# Patient Record
Sex: MALE | Race: BLACK OR AFRICAN AMERICAN | NOT HISPANIC OR LATINO
[De-identification: names, ages, dates, MRNs, and addresses within clinical notes are randomized per-mention and may not be internally consistent; named-entity substitution may affect disease eponyms.]

---

## 2017-01-06 ENCOUNTER — MEDICATION RENEWAL (OUTPATIENT)
Age: 22
End: 2017-01-06

## 2017-01-23 ENCOUNTER — APPOINTMENT (OUTPATIENT)
Dept: PEDIATRIC NEUROLOGY | Facility: CLINIC | Age: 22
End: 2017-01-23

## 2017-02-13 ENCOUNTER — MEDICATION RENEWAL (OUTPATIENT)
Age: 22
End: 2017-02-13

## 2017-03-20 ENCOUNTER — MEDICATION RENEWAL (OUTPATIENT)
Age: 22
End: 2017-03-20

## 2017-03-27 ENCOUNTER — MESSAGE (OUTPATIENT)
Age: 22
End: 2017-03-27

## 2017-03-27 ENCOUNTER — APPOINTMENT (OUTPATIENT)
Dept: PEDIATRIC NEUROLOGY | Facility: CLINIC | Age: 22
End: 2017-03-27

## 2017-05-08 ENCOUNTER — APPOINTMENT (OUTPATIENT)
Dept: PEDIATRIC NEUROLOGY | Facility: CLINIC | Age: 22
End: 2017-05-08

## 2017-05-08 ENCOUNTER — LABORATORY RESULT (OUTPATIENT)
Age: 22
End: 2017-05-08

## 2017-05-08 VITALS
DIASTOLIC BLOOD PRESSURE: 88 MMHG | BODY MASS INDEX: 33.99 KG/M2 | HEIGHT: 65.75 IN | WEIGHT: 209 LBS | SYSTOLIC BLOOD PRESSURE: 140 MMHG

## 2017-05-08 DIAGNOSIS — F98.4 STEREOTYPED MOVEMENT DISORDERS: ICD-10-CM

## 2017-05-14 LAB
ALBUMIN SERPL ELPH-MCNC: 4.8 G/DL
ALP BLD-CCNC: 78 U/L
ALT SERPL-CCNC: 21 U/L
ANION GAP SERPL CALC-SCNC: 18 MMOL/L
AST SERPL-CCNC: 16 U/L
BASOPHILS # BLD AUTO: 0.05 K/UL
BASOPHILS NFR BLD AUTO: 0.9 %
BILIRUB SERPL-MCNC: 0.2 MG/DL
BUN SERPL-MCNC: 8 MG/DL
CALCIUM SERPL-MCNC: 10.4 MG/DL
CHLORIDE SERPL-SCNC: 102 MMOL/L
CO2 SERPL-SCNC: 20 MMOL/L
CREAT SERPL-MCNC: 0.86 MG/DL
EOSINOPHIL # BLD AUTO: 0.13 K/UL
EOSINOPHIL NFR BLD AUTO: 2.6 %
GLUCOSE SERPL-MCNC: 80 MG/DL
HCT VFR BLD CALC: 45.1 %
HGB BLD-MCNC: 14.7 G/DL
LAMOTRIGINE SERPL-MCNC: 10.2 MCG/ML
LYMPHOCYTES # BLD AUTO: 1.62 K/UL
LYMPHOCYTES NFR BLD AUTO: 31.6 %
MAN DIFF?: NORMAL
MCHC RBC-ENTMCNC: 23.7 PG
MCHC RBC-ENTMCNC: 32.6 GM/DL
MCV RBC AUTO: 72.7 FL
MONOCYTES # BLD AUTO: 0.27 K/UL
MONOCYTES NFR BLD AUTO: 5.3 %
NEUTROPHILS # BLD AUTO: 2.74 K/UL
NEUTROPHILS NFR BLD AUTO: 53.5 %
PLATELET # BLD AUTO: 202 K/UL
POTASSIUM SERPL-SCNC: 4.6 MMOL/L
PROT SERPL-MCNC: 8 G/DL
RBC # BLD: 6.2 M/UL
RBC # FLD: 15.9 %
SODIUM SERPL-SCNC: 140 MMOL/L
WBC # FLD AUTO: 5.12 K/UL

## 2017-05-26 ENCOUNTER — MEDICATION RENEWAL (OUTPATIENT)
Age: 22
End: 2017-05-26

## 2017-06-13 ENCOUNTER — MEDICATION RENEWAL (OUTPATIENT)
Age: 22
End: 2017-06-13

## 2017-06-23 ENCOUNTER — MEDICATION RENEWAL (OUTPATIENT)
Age: 22
End: 2017-06-23

## 2017-07-26 ENCOUNTER — MEDICATION RENEWAL (OUTPATIENT)
Age: 22
End: 2017-07-26

## 2017-08-13 ENCOUNTER — FORM ENCOUNTER (OUTPATIENT)
Age: 22
End: 2017-08-13

## 2017-08-14 ENCOUNTER — APPOINTMENT (OUTPATIENT)
Dept: MRI IMAGING | Facility: HOSPITAL | Age: 22
End: 2017-08-14
Payer: COMMERCIAL

## 2017-08-14 ENCOUNTER — OUTPATIENT (OUTPATIENT)
Dept: OUTPATIENT SERVICES | Age: 22
LOS: 1 days | End: 2017-08-14

## 2017-08-14 VITALS
HEART RATE: 67 BPM | WEIGHT: 206.35 LBS | HEIGHT: 66.14 IN | DIASTOLIC BLOOD PRESSURE: 85 MMHG | OXYGEN SATURATION: 98 % | RESPIRATION RATE: 18 BRPM | SYSTOLIC BLOOD PRESSURE: 129 MMHG | TEMPERATURE: 98 F

## 2017-08-14 VITALS
RESPIRATION RATE: 18 BRPM | SYSTOLIC BLOOD PRESSURE: 128 MMHG | OXYGEN SATURATION: 95 % | DIASTOLIC BLOOD PRESSURE: 74 MMHG | HEART RATE: 79 BPM

## 2017-08-14 DIAGNOSIS — G40.919 EPILEPSY, UNSPECIFIED, INTRACTABLE, WITHOUT STATUS EPILEPTICUS: ICD-10-CM

## 2017-08-14 PROCEDURE — 70551 MRI BRAIN STEM W/O DYE: CPT | Mod: 26

## 2017-08-25 ENCOUNTER — MEDICATION RENEWAL (OUTPATIENT)
Age: 22
End: 2017-08-25

## 2017-09-25 ENCOUNTER — MEDICATION RENEWAL (OUTPATIENT)
Age: 22
End: 2017-09-25

## 2017-10-23 ENCOUNTER — APPOINTMENT (OUTPATIENT)
Dept: PEDIATRIC NEUROLOGY | Facility: CLINIC | Age: 22
End: 2017-10-23
Payer: COMMERCIAL

## 2017-10-23 VITALS
SYSTOLIC BLOOD PRESSURE: 134 MMHG | HEART RATE: 82 BPM | DIASTOLIC BLOOD PRESSURE: 82 MMHG | HEIGHT: 65.75 IN | BODY MASS INDEX: 33.5 KG/M2 | WEIGHT: 206 LBS

## 2017-10-23 DIAGNOSIS — G40.919 EPILEPSY, UNSPECIFIED, INTRACTABLE, W/OUT STATUS EPILEPTICUS: ICD-10-CM

## 2017-10-23 DIAGNOSIS — F84.0 AUTISTIC DISORDER: ICD-10-CM

## 2017-10-23 DIAGNOSIS — R41.89 OTHER SYMPTOMS AND SIGNS INVOLVING COGNITIVE FUNCTIONS AND AWARENESS: ICD-10-CM

## 2017-10-23 PROCEDURE — 99214 OFFICE O/P EST MOD 30 MIN: CPT

## 2017-11-09 ENCOUNTER — OTHER (OUTPATIENT)
Age: 22
End: 2017-11-09

## 2017-11-30 ENCOUNTER — RX RENEWAL (OUTPATIENT)
Age: 22
End: 2017-11-30

## 2017-12-18 ENCOUNTER — APPOINTMENT (OUTPATIENT)
Dept: NEUROLOGY | Facility: CLINIC | Age: 22
End: 2017-12-18

## 2018-01-05 ENCOUNTER — RX RENEWAL (OUTPATIENT)
Age: 23
End: 2018-01-05

## 2018-01-08 ENCOUNTER — RX RENEWAL (OUTPATIENT)
Age: 23
End: 2018-01-08

## 2018-01-10 ENCOUNTER — APPOINTMENT (OUTPATIENT)
Dept: NEUROLOGY | Facility: CLINIC | Age: 23
End: 2018-01-10
Payer: COMMERCIAL

## 2018-01-10 VITALS
HEART RATE: 82 BPM | HEIGHT: 66 IN | SYSTOLIC BLOOD PRESSURE: 130 MMHG | WEIGHT: 208 LBS | BODY MASS INDEX: 33.43 KG/M2 | DIASTOLIC BLOOD PRESSURE: 77 MMHG

## 2018-01-10 PROCEDURE — 99205 OFFICE O/P NEW HI 60 MIN: CPT

## 2018-02-05 ENCOUNTER — INPATIENT (INPATIENT)
Facility: HOSPITAL | Age: 23
LOS: 4 days | Discharge: HOME | DRG: 101 | End: 2018-02-10
Attending: PSYCHIATRY & NEUROLOGY | Admitting: PSYCHIATRY & NEUROLOGY
Payer: COMMERCIAL

## 2018-02-05 VITALS
SYSTOLIC BLOOD PRESSURE: 115 MMHG | WEIGHT: 231.49 LBS | HEIGHT: 66 IN | HEART RATE: 84 BPM | RESPIRATION RATE: 18 BRPM | DIASTOLIC BLOOD PRESSURE: 70 MMHG | OXYGEN SATURATION: 100 % | TEMPERATURE: 98 F

## 2018-02-05 DIAGNOSIS — G40.909 EPILEPSY, UNSPECIFIED, NOT INTRACTABLE, WITHOUT STATUS EPILEPTICUS: ICD-10-CM

## 2018-02-05 LAB
ALBUMIN SERPL ELPH-MCNC: 4.6 G/DL — SIGNIFICANT CHANGE UP (ref 3.3–5)
ALP SERPL-CCNC: 76 U/L — SIGNIFICANT CHANGE UP (ref 40–120)
ALT FLD-CCNC: 26 U/L — SIGNIFICANT CHANGE UP (ref 10–45)
ANION GAP SERPL CALC-SCNC: 21 MMOL/L — HIGH (ref 5–17)
APTT BLD: 32.8 SEC — SIGNIFICANT CHANGE UP (ref 27.5–37.4)
AST SERPL-CCNC: 24 U/L — SIGNIFICANT CHANGE UP (ref 10–40)
BILIRUB SERPL-MCNC: 0.2 MG/DL — SIGNIFICANT CHANGE UP (ref 0.2–1.2)
BUN SERPL-MCNC: 9 MG/DL — SIGNIFICANT CHANGE UP (ref 7–23)
CALCIUM SERPL-MCNC: 10.5 MG/DL — SIGNIFICANT CHANGE UP (ref 8.4–10.5)
CHLORIDE SERPL-SCNC: 99 MMOL/L — SIGNIFICANT CHANGE UP (ref 96–108)
CO2 SERPL-SCNC: 19 MMOL/L — LOW (ref 22–31)
CREAT SERPL-MCNC: 0.98 MG/DL — SIGNIFICANT CHANGE UP (ref 0.5–1.3)
GLUCOSE SERPL-MCNC: 69 MG/DL — LOW (ref 70–99)
INR BLD: 1.05 RATIO — SIGNIFICANT CHANGE UP (ref 0.88–1.16)
POTASSIUM SERPL-MCNC: 5.2 MMOL/L — SIGNIFICANT CHANGE UP (ref 3.5–5.3)
POTASSIUM SERPL-SCNC: 5.2 MMOL/L — SIGNIFICANT CHANGE UP (ref 3.5–5.3)
PROT SERPL-MCNC: 8.1 G/DL — SIGNIFICANT CHANGE UP (ref 6–8.3)
PROTHROM AB SERPL-ACNC: 11.9 SEC — SIGNIFICANT CHANGE UP (ref 10–13.1)
SODIUM SERPL-SCNC: 139 MMOL/L — SIGNIFICANT CHANGE UP (ref 135–145)

## 2018-02-05 PROCEDURE — 95819 EEG AWAKE AND ASLEEP: CPT | Mod: 26

## 2018-02-05 PROCEDURE — 99222 1ST HOSP IP/OBS MODERATE 55: CPT

## 2018-02-05 RX ORDER — LAMOTRIGINE 25 MG/1
200 TABLET, ORALLY DISINTEGRATING ORAL
Qty: 0 | Refills: 0 | Status: DISCONTINUED | OUTPATIENT
Start: 2018-02-05 | End: 2018-02-05

## 2018-02-05 RX ORDER — LAMOTRIGINE 25 MG/1
150 TABLET, ORALLY DISINTEGRATING ORAL ONCE
Qty: 0 | Refills: 0 | Status: COMPLETED | OUTPATIENT
Start: 2018-02-06 | End: 2018-02-06

## 2018-02-05 RX ORDER — LAMOTRIGINE 25 MG/1
50 TABLET, ORALLY DISINTEGRATING ORAL AT BEDTIME
Qty: 0 | Refills: 0 | Status: DISCONTINUED | OUTPATIENT
Start: 2018-02-05 | End: 2018-02-05

## 2018-02-05 RX ORDER — ENOXAPARIN SODIUM 100 MG/ML
40 INJECTION SUBCUTANEOUS EVERY 24 HOURS
Qty: 0 | Refills: 0 | Status: DISCONTINUED | OUTPATIENT
Start: 2018-02-05 | End: 2018-02-10

## 2018-02-05 RX ORDER — LAMOTRIGINE 25 MG/1
200 TABLET, ORALLY DISINTEGRATING ORAL ONCE
Qty: 0 | Refills: 0 | Status: COMPLETED | OUTPATIENT
Start: 2018-02-05 | End: 2018-02-05

## 2018-02-05 RX ORDER — CLOBAZAM 10 MG/1
0 TABLET ORAL
Qty: 0 | Refills: 0 | COMMUNITY

## 2018-02-05 RX ADMIN — LAMOTRIGINE 200 MILLIGRAM(S): 25 TABLET, ORALLY DISINTEGRATING ORAL at 21:05

## 2018-02-05 NOTE — H&P ADULT - ASSESSMENT
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 His father agrees to video EEG monitoring to characterize his seizures, and consider epilepsy surgery if he is a good candidate. His father would also like a trial of CBD.      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 His father agrees to video EEG monitoring to characterize his seizures, and consider epilepsy surgery if he is a good candidate. His father would also like a trial of CBD.      Plan:   - will decrease Lamictal, then dc   - will continue w/ Onfi   - vEEG monitoring   - seizure precautions

## 2018-02-05 NOTE — H&P ADULT - HISTORY OF PRESENT ILLNESS
23 yo man, accompanied by his father. According to his father, he developed normally until the age of 9 when he had a febrile seizure that presented in status epilepticus leading to a coma and ICU stay for 2 months. They were told he had an encephalitis. He was left with a cognitive impairment and intractable focal epilepsy ever since, and since then his longest seizure free interval has been 2-3 weeks. He was being followed by pediatric neurology, but now needs adult neurological care. He has been described as having autistic features and MR. He is nonverbal, and is dependent on his parents for care and supervision.     His father has not considered epilepsy surgery but is willing to learn more. He is interested in a trial of CBD.    Seizure Description: He develops a blank stare and his head and eyes appear to deviate up and to the left. He can secondarily generalize.    Seizure Frequency: focal seizures occur in clusters of up to 3-4 in one day, but the daily occurrence varies. GTC seizures occur every 2-3 weeks.    Current AEDs:  Lamotrigine 200mg qAM and 250mg qOM  Onfi 20mg qAM and 25mg qPM    Previous AEDs:  OXC  VPA  LEV  CBZ - allergy  PHT - allergy  PhB - allergy    Brain MRI 2005: L tempora arachnoid cyst    VEEG 5/20111: bilateral independent frontal spikes    EEG 6/2014: generalized background slowing     Social Hx: does not attend school anymore because he would get sent home often because of seizures. Lives at home with parents, has 3 brothers and 2 sisters.    Birth Hx: born premature at 7 months after mother had premature rupture of membranes. He was "born blue", and was in NICU x 4 days.     Developmental Hx: developed normally until age 9 when he had encephalitis    Epilepsy Risk Factors: +febrile seizures, encephalitis

## 2018-02-05 NOTE — H&P ADULT - ATTENDING COMMENTS
22 M with static encephalopathy with autistic features s/p encephalitis with SE at age 9, with intractable epilepsy. Also has a h/o premature birth (7 month) due to rupture of the membranes but had normal development until the encephalitis. Seizures: blank stare, then eye deviation up and to the left, then GTC at times. Focal seizures occur in clusters 3-4/day, but not every day. GTC every 2-3 weeks. He followed with pediatric epilepsy until recently when they established care with Dr. Fleming.   Home AEDs: /250, Onfi 20/25  Previous: OXC, VPA, LVT, CBZ (allergy), PHT (allergy), PB (allergy)  MRI 2005 L temp. arachnoid cyst; 2017 stable cerebral atrophy, no MTS or dysplasia  EEG 2014: diffuse slowing; 2011 bilateral independent frontal spikes  Social Hx: does not attend school anymore because he would get sent home often because of seizures. Lives at home with parents, has 3 brothers and 2 sisters.  Birth Hx: born premature at 7 months after mother had premature rupture of membranes. He was "born blue", and was in NICU x 4 days.     So far EEG showed diffuse slowing and equivocal frontal sharply contoured theta.   Plan: will capture and characterize discharges and possibly seizures. Given history or frequent GTCs will taper very slowly. C/w onfi at home dose, decrease LMT to 150mg bid tonight

## 2018-02-05 NOTE — H&P ADULT - NSHPPHYSICALEXAM_GEN_ALL_CORE
Constitutional: well-appearing, NID  HEENT: normocephalic, no dysmorphic facial features, no ocular abnormalities, no TPC  Chest: lungs clear, heart sounds regular in rate and rhythm  MS: alert, poorly-related, non-verbal, follows some simple instructions well  CN: full extraocular movements, no facial asymmetry or weakness, hears verbal instructions without difficulty  Motor: Normal functional strength: favors left, decreased arm swing R when walking, R foot tends to turn out as well   Sensation: localizes LT  Coordination: no gait ataxia, no limb dysmetria.

## 2018-02-06 ENCOUNTER — RX RENEWAL (OUTPATIENT)
Age: 23
End: 2018-02-06

## 2018-02-06 LAB
HCT VFR BLD CALC: 45.9 % — SIGNIFICANT CHANGE UP (ref 39–50)
HGB BLD-MCNC: 14.3 G/DL — SIGNIFICANT CHANGE UP (ref 13–17)
MCHC RBC-ENTMCNC: 22.8 PG — LOW (ref 27–34)
MCHC RBC-ENTMCNC: 31.2 GM/DL — LOW (ref 32–36)
MCV RBC AUTO: 73.3 FL — LOW (ref 80–100)
PLATELET # BLD AUTO: 225 K/UL — SIGNIFICANT CHANGE UP (ref 150–400)
RBC # BLD: 6.26 M/UL — HIGH (ref 4.2–5.8)
RBC # FLD: 15.4 % — HIGH (ref 10.3–14.5)
WBC # BLD: 6.44 K/UL — SIGNIFICANT CHANGE UP (ref 3.8–10.5)
WBC # FLD AUTO: 6.44 K/UL — SIGNIFICANT CHANGE UP (ref 3.8–10.5)

## 2018-02-06 PROCEDURE — 95951: CPT | Mod: 26

## 2018-02-06 PROCEDURE — 99232 SBSQ HOSP IP/OBS MODERATE 35: CPT

## 2018-02-06 RX ORDER — LAMOTRIGINE 25 MG/1
150 TABLET, ORALLY DISINTEGRATING ORAL ONCE
Qty: 0 | Refills: 0 | Status: COMPLETED | OUTPATIENT
Start: 2018-02-06 | End: 2018-02-06

## 2018-02-06 RX ORDER — LAMOTRIGINE 25 MG/1
100 TABLET, ORALLY DISINTEGRATING ORAL
Qty: 0 | Refills: 0 | Status: DISCONTINUED | OUTPATIENT
Start: 2018-02-07 | End: 2018-02-08

## 2018-02-06 RX ADMIN — LAMOTRIGINE 150 MILLIGRAM(S): 25 TABLET, ORALLY DISINTEGRATING ORAL at 21:08

## 2018-02-06 RX ADMIN — LAMOTRIGINE 150 MILLIGRAM(S): 25 TABLET, ORALLY DISINTEGRATING ORAL at 12:34

## 2018-02-06 RX ADMIN — ENOXAPARIN SODIUM 40 MILLIGRAM(S): 100 INJECTION SUBCUTANEOUS at 05:12

## 2018-02-07 LAB — LAMOTRIGINE SERPL-MCNC: 9.6 MCG/ML — SIGNIFICANT CHANGE UP (ref 2.5–15)

## 2018-02-07 PROCEDURE — 99232 SBSQ HOSP IP/OBS MODERATE 35: CPT

## 2018-02-07 PROCEDURE — 95951: CPT | Mod: 26

## 2018-02-07 RX ADMIN — LAMOTRIGINE 100 MILLIGRAM(S): 25 TABLET, ORALLY DISINTEGRATING ORAL at 06:18

## 2018-02-07 RX ADMIN — ENOXAPARIN SODIUM 40 MILLIGRAM(S): 100 INJECTION SUBCUTANEOUS at 06:18

## 2018-02-07 RX ADMIN — LAMOTRIGINE 100 MILLIGRAM(S): 25 TABLET, ORALLY DISINTEGRATING ORAL at 17:14

## 2018-02-07 NOTE — PROGRESS NOTE ADULT - SUBJECTIVE AND OBJECTIVE BOX
S: The patient is visited at the bedside, he is minimally verbal, no acute complains. No overnight events.    O:  Vital Signs Last 24 Hrs  T(C): 36.4 (07 Feb 2018 05:30), Max: 37 (06 Feb 2018 16:10)  T(F): 97.6 (07 Feb 2018 05:30), Max: 98.6 (06 Feb 2018 16:10)  HR: 81 (07 Feb 2018 07:46) (74 - 101)  BP: 117/77 (07 Feb 2018 07:46) (112/74 - 127/75)  BP(mean): --  RR: 16 (07 Feb 2018 07:46) (16 - 19)  SpO2: 97% (07 Feb 2018 07:46) (97% - 99%)    MEDICATIONS  (STANDING):  Clobazam 20 milliGRAM(s) Oral daily  Clobazam 25 milliGRAM(s) Oral at bedtime  enoxaparin Injectable 40 milliGRAM(s) SubCutaneous every 24 hours  lamoTRIgine 100 milliGRAM(s) Oral two times a day    Exam  Alert, orientation can't be assessed to to limited speech, says only yes and no, follows some simple commands,  5/5 strength in all extremities, normal tone and bulk.  Deep tendon reflexes:   Biceps right 2+. Biceps left 2+  Patella right 2+. Patella left 2+.  gait normal

## 2018-02-07 NOTE — PROGRESS NOTE ADULT - ASSESSMENT
21 yo m with PMHx of encephalitis at age 9 and prolonged ICU admission complicated by GTCS and intellectual disability. He continues to have seizures, clusters of 3-4 every 3-4 months. Seizures are GTCS, with head and eyes tilting to the left side, with postictal and tongue biting. Can't describe any aura. Here for seizure capture, and possible intervention or medication change. Last seizure was almost a week ago.  Home AEDs: Onfi 20/25, Lamictal 200/250  2/7 no clinical seizures were captured so far; EEG generalized mild background slowing    Plan:  [] Slow taper of lamictal to 100 BID today  [] C/W Onfi for now  [] Seizure percautions  [] Ativan 2 mg if seizures > 3 min, or more than 2 seizures in less than an hour and > 3 seizures in 24 hours 23 yo m with PMHx of encephalitis at age 9 and prolonged ICU admission complicated by GTCS and intellectual disability. He continues to have seizures, clusters of 3-4 every 3-4 months. Seizures are GTCS, with head and eyes tilting to the left side, with postictal and tongue biting. Can't describe any aura. Here for seizure capture, and possible intervention or medication change. Last seizure was almost a week ago.  Home AEDs: Onfi 20/25, Lamictal 200/250  2/7 no clinical seizures were captured so far; EEG generalized mild background slowing, will decrease Lamictal to 25 BID    Plan:  [] Lamictal 25 BID  [] C/W Onfi for now  [] Seizure precautions  [] Ativan 2 mg if seizures > 3 min, or more than 2 seizures in less than an hour and > 3 seizures in 24 hours

## 2018-02-08 PROCEDURE — 93010 ELECTROCARDIOGRAM REPORT: CPT

## 2018-02-08 PROCEDURE — 93010 ELECTROCARDIOGRAM REPORT: CPT | Mod: 77

## 2018-02-08 PROCEDURE — 95951: CPT | Mod: 26

## 2018-02-08 PROCEDURE — 99232 SBSQ HOSP IP/OBS MODERATE 35: CPT

## 2018-02-08 RX ORDER — LAMOTRIGINE 25 MG/1
25 TABLET, ORALLY DISINTEGRATING ORAL
Qty: 0 | Refills: 0 | Status: DISCONTINUED | OUTPATIENT
Start: 2018-02-08 | End: 2018-02-09

## 2018-02-08 RX ADMIN — ENOXAPARIN SODIUM 40 MILLIGRAM(S): 100 INJECTION SUBCUTANEOUS at 06:24

## 2018-02-08 RX ADMIN — LAMOTRIGINE 25 MILLIGRAM(S): 25 TABLET, ORALLY DISINTEGRATING ORAL at 17:12

## 2018-02-08 RX ADMIN — LAMOTRIGINE 100 MILLIGRAM(S): 25 TABLET, ORALLY DISINTEGRATING ORAL at 06:24

## 2018-02-08 NOTE — PROGRESS NOTE ADULT - ASSESSMENT
21 yo m with PMHx of encephalitis at age 9 and prolonged ICU admission complicated by GTCS and intellectual disability. He continues to have seizures, clusters of 3-4 every 3-4 months. Seizures are GTCS, with head and eyes tilting to the left side, with postictal and tongue biting. Can't describe any aura. Here for seizure capture, and possible intervention or medication change. Last seizure was almost a week ago.  Home AEDs: Onfi 20/25, Lamictal 200/250  2/7 no clinical seizures were captured so far; EEG generalized mild background slowing, will decrease Lamictal to 25 BID  2/8 no seizures    Plan:  [] Lamictal 200/250  [] C/W Onfi 20/25  [] Seizure precautions  [] Ativan 2 mg if seizures > 3 min, or more than 2 seizures in less than an hour and > 3 seizures in 24 hours

## 2018-02-08 NOTE — PROGRESS NOTE ADULT - SUBJECTIVE AND OBJECTIVE BOX
S: The patient is visited at the bedside, he is minimally verbal, no acute complains. No overnight events.    O:  Vital Signs Last 24 Hrs  T(C): 36.7 (09 Feb 2018 11:58), Max: 36.8 (08 Feb 2018 15:44)  T(F): 98.1 (09 Feb 2018 11:58), Max: 98.2 (08 Feb 2018 15:44)  HR: 69 (09 Feb 2018 11:58) (68 - 96)  BP: 119/64 (09 Feb 2018 11:58) (107/70 - 120/72)  BP(mean): --  RR: 18 (09 Feb 2018 11:58) (17 - 18)  SpO2: 96% (09 Feb 2018 11:58) (94% - 97%)    MEDICATIONS  (STANDING):  Clobazam 20 milliGRAM(s) Oral daily  Clobazam 25 milliGRAM(s) Oral at bedtime  enoxaparin Injectable 40 milliGRAM(s) SubCutaneous every 24 hours  lamoTRIgine 200 milliGRAM(s) Oral two times a day  lamoTRIgine 50 milliGRAM(s) Oral at bedtime    MEDICATIONS  (PRN):    Exam  Alert, orientation can't be assessed to to limited speech, says only yes and no, follows some simple commands,  5/5 strength in all extremities, normal tone and bulk.  Deep tendon reflexes:   Biceps right 2+. Biceps left 2+  Patella right 2+. Patella left 2+.  gait normal

## 2018-02-09 PROCEDURE — 95951: CPT | Mod: 26

## 2018-02-09 PROCEDURE — 99232 SBSQ HOSP IP/OBS MODERATE 35: CPT

## 2018-02-09 RX ORDER — LAMOTRIGINE 25 MG/1
50 TABLET, ORALLY DISINTEGRATING ORAL AT BEDTIME
Qty: 0 | Refills: 0 | Status: DISCONTINUED | OUTPATIENT
Start: 2018-02-09 | End: 2018-02-10

## 2018-02-09 RX ORDER — LAMOTRIGINE 25 MG/1
200 TABLET, ORALLY DISINTEGRATING ORAL
Qty: 0 | Refills: 0 | Status: DISCONTINUED | OUTPATIENT
Start: 2018-02-09 | End: 2018-02-10

## 2018-02-09 RX ADMIN — ENOXAPARIN SODIUM 40 MILLIGRAM(S): 100 INJECTION SUBCUTANEOUS at 05:47

## 2018-02-09 RX ADMIN — LAMOTRIGINE 50 MILLIGRAM(S): 25 TABLET, ORALLY DISINTEGRATING ORAL at 21:43

## 2018-02-09 RX ADMIN — LAMOTRIGINE 200 MILLIGRAM(S): 25 TABLET, ORALLY DISINTEGRATING ORAL at 17:11

## 2018-02-09 RX ADMIN — LAMOTRIGINE 200 MILLIGRAM(S): 25 TABLET, ORALLY DISINTEGRATING ORAL at 13:11

## 2018-02-09 RX ADMIN — LAMOTRIGINE 25 MILLIGRAM(S): 25 TABLET, ORALLY DISINTEGRATING ORAL at 05:48

## 2018-02-09 NOTE — PROGRESS NOTE ADULT - ATTENDING COMMENTS
Restart Lamotrigine and planned discharge tomorrow.   EEG did not show clear epileptiform discharges, but intermittent frontal sharply contoured theta (likely normal variant). No seizures captured.   The patient's father would be interested in CBD trial. If no success and continued suspicion for focal epilepsy persists, could consider remonitoring in the EMU in the future. Dr. Reagan discussed plan with the patient's father per phone who agreed.
On EEG besides mild diffuse slowing, and sharply contoured frontally predominant theta, which is unlikely epileptiform, no clear seizures captured. Continue vEEG monitoring on lowered LMT. The father mentioned to Dr. Anders (as well as Dr. Fleming in clinic) that he is interested in a CBD trial, which Dr. Fleming is trying to setup as an outpatient (per chart).

## 2018-02-10 ENCOUNTER — TRANSCRIPTION ENCOUNTER (OUTPATIENT)
Age: 23
End: 2018-02-10

## 2018-02-10 VITALS
RESPIRATION RATE: 18 BRPM | TEMPERATURE: 98 F | SYSTOLIC BLOOD PRESSURE: 128 MMHG | OXYGEN SATURATION: 98 % | HEART RATE: 75 BPM | DIASTOLIC BLOOD PRESSURE: 78 MMHG

## 2018-02-10 PROCEDURE — 93005 ELECTROCARDIOGRAM TRACING: CPT

## 2018-02-10 PROCEDURE — 95951: CPT | Mod: 26

## 2018-02-10 PROCEDURE — 85027 COMPLETE CBC AUTOMATED: CPT

## 2018-02-10 PROCEDURE — 80053 COMPREHEN METABOLIC PANEL: CPT

## 2018-02-10 PROCEDURE — 85610 PROTHROMBIN TIME: CPT

## 2018-02-10 PROCEDURE — 80175 DRUG SCREEN QUAN LAMOTRIGINE: CPT

## 2018-02-10 PROCEDURE — 99238 HOSP IP/OBS DSCHRG MGMT 30/<: CPT

## 2018-02-10 PROCEDURE — 95951: CPT

## 2018-02-10 PROCEDURE — 85730 THROMBOPLASTIN TIME PARTIAL: CPT

## 2018-02-10 PROCEDURE — 95819 EEG AWAKE AND ASLEEP: CPT

## 2018-02-10 RX ORDER — LAMOTRIGINE 25 MG/1
1 TABLET, ORALLY DISINTEGRATING ORAL
Qty: 30 | Refills: 0
Start: 2018-02-10 | End: 2018-03-11

## 2018-02-10 RX ORDER — CLOBAZAM 10 MG/1
2.5 TABLET ORAL
Qty: 75 | Refills: 0
Start: 2018-02-10 | End: 2018-03-11

## 2018-02-10 RX ORDER — LAMOTRIGINE 25 MG/1
250 TABLET, ORALLY DISINTEGRATING ORAL
Qty: 0 | Refills: 0 | COMMUNITY

## 2018-02-10 RX ORDER — CLOBAZAM 10 MG/1
1 TABLET ORAL
Qty: 30 | Refills: 0
Start: 2018-02-10 | End: 2018-03-11

## 2018-02-10 RX ORDER — POLYETHYLENE GLYCOL 3350 17 G/17G
0 POWDER, FOR SOLUTION ORAL
Qty: 0 | Refills: 0 | COMMUNITY

## 2018-02-10 RX ORDER — LAMOTRIGINE 25 MG/1
1 TABLET, ORALLY DISINTEGRATING ORAL
Qty: 0 | Refills: 0 | COMMUNITY

## 2018-02-10 RX ORDER — LAMOTRIGINE 25 MG/1
2.5 TABLET, ORALLY DISINTEGRATING ORAL
Qty: 75 | Refills: 3
Start: 2018-02-10 | End: 2018-06-09

## 2018-02-10 RX ADMIN — ENOXAPARIN SODIUM 40 MILLIGRAM(S): 100 INJECTION SUBCUTANEOUS at 06:18

## 2018-02-10 RX ADMIN — LAMOTRIGINE 200 MILLIGRAM(S): 25 TABLET, ORALLY DISINTEGRATING ORAL at 06:18

## 2018-02-10 NOTE — DISCHARGE NOTE ADULT - MEDICATION SUMMARY - MEDICATIONS TO TAKE
I will START or STAY ON the medications listed below when I get home from the hospital:    Onfi  -- 20 milligram(s) by mouth once a day in AM   -- Indication: For Nonintractable epilepsy without status epilepticus    Onfi  -- 25 milligram(s) by mouth once a day (at bedtime)  -- Indication: For Nonintractable epilepsy without status epilepticus    lamoTRIgine 200 mg oral tablet  -- 1 tab(s) by mouth once a day in AM  -- Indication: For Nonintractable epilepsy without status epilepticus    lamoTRIgine  -- 250 milligram(s) by mouth once a day (at bedtime)  -- Indication: For Nonintractable epilepsy without status epilepticus    MiraLax  -- 1 dose(s) by mouth once a day, As Needed  -- Indication: For Nonintractable epilepsy without status epilepticus

## 2018-02-10 NOTE — DISCHARGE NOTE ADULT - CARE PROVIDER_API CALL
Surinder Fleming), Clinical Neurophysiology; Neurology  [address] [address]  Phone: [phone]  Fax: [phone]

## 2018-02-10 NOTE — DISCHARGE NOTE ADULT - CARE PLAN
Principal Discharge DX:	Other generalized epilepsy, intractable, without status epilepticus  Goal:	Close neurological follow up.  Assessment and plan of treatment:	Please see Dr. Fleming at his next available appointment. Please take your medications as prescribed. There was no change to your medications during this hospitalization.

## 2018-02-10 NOTE — DISCHARGE NOTE ADULT - PATIENT PORTAL LINK FT
You can access the [facility], offered by [facility], by registering with the following website: [email or web address]

## 2018-02-10 NOTE — DISCHARGE NOTE ADULT - HOSPITAL COURSE
HPI:  23 yo man, accompanied by his father. According to his father, he developed normally until the age of 9 when he had a febrile seizure that presented in status epilepticus leading to a coma and ICU stay for 2 months. They were told he had an encephalitis. He was left with a cognitive impairment and intractable focal epilepsy ever since, and since then his longest seizure free interval has been 2-3 weeks. He was being followed by pediatric neurology, but now needs adult neurological care. He has been described as having autistic features and MR. He is nonverbal, and is dependent on his parents for care and supervision.     His father has not considered epilepsy surgery but is willing to learn more. He is interested in a trial of CBD.    Seizure Description: He develops a blank stare and his head and eyes appear to deviate up and to the left. He can secondarily generalize.    Seizure Frequency: focal seizures occur in clusters of up to 3-4 in one day, but the daily occurrence varies. GTC seizures occur every 2-3 weeks.    Current AEDs:  Lamotrigine 200mg qAM and 250mg qOM  Onfi 20mg qAM and 25mg qPM    Previous AEDs:  OXC  VPA  LEV  CBZ - allergy  PHT - allergy  PhB - allergy    Brain MRI 2005: L tempora arachnoid cyst    VEEG 5/20111: bilateral independent frontal spikes    EEG 6/2014: generalized background slowing     Social Hx: does not attend school anymore because he would get sent home often because of seizures. Lives at home with parents, has 3 brothers and 2 sisters.    Birth Hx: born premature at 7 months after mother had premature rupture of membranes. He was "born blue", and was in NICU x 4 days.     Developmental Hx: developed normally until age 9 when he had encephalitis    Epilepsy Risk Factors: +febrile seizures, encephalitis (05 Feb 2018 16:01) HPI:  23 yo man, accompanied by his father. According to his father, he developed normally until the age of 9 when he had a febrile seizure that presented in status epilepticus leading to a coma and ICU stay for 2 months. They were told he had an encephalitis. He was left with a cognitive impairment and intractable focal epilepsy ever since, and since then his longest seizure free interval has been 2-3 weeks. He was being followed by pediatric neurology, but now needs adult neurological care. He has been described as having autistic features and MR. He is nonverbal, and is dependent on his parents for care and supervision.     His father has not considered epilepsy surgery but is willing to learn more. He is interested in a trial of CBD.    Seizure Description: He develops a blank stare and his head and eyes appear to deviate up and to the left. He can secondarily generalize.    Seizure Frequency: focal seizures occur in clusters of up to 3-4 in one day, but the daily occurrence varies. GTC seizures occur every 2-3 weeks.    Current AEDs:  Lamotrigine 200mg qAM and 250mg qOM  Onfi 20mg qAM and 25mg qPM    Previous AEDs:  OXC  VPA  LEV  CBZ - allergy  PHT - allergy  PhB - allergy    Brain MRI 2005: L tempora arachnoid cyst    VEEG 5/2011: bilateral independent frontal spikes    EEG 6/2014: generalized background slowing     Social Hx: does not attend school anymore because he would get sent home often because of seizures. Lives at home with parents, has 3 brothers and 2 sisters.    Birth Hx: born premature at 7 months after mother had premature rupture of membranes. He was "born blue", and was in NICU x 4 days.     Developmental Hx: developed normally until age 9 when he had encephalitis    Epilepsy Risk Factors: +febrile seizures, encephalitis (05 Feb 2018 16:01)      VEEG showed slowing and right frontal slowing, rare sharp waves in sleep.  meds tapered, then restarted prior to d/c home.

## 2018-02-10 NOTE — DISCHARGE NOTE ADULT - PLAN OF CARE
Close neurological follow up. Please see Dr. Fleming at his next available appointment. Please take your medications as prescribed. There was no change to your medications during this hospitalization.

## 2018-02-26 ENCOUNTER — APPOINTMENT (OUTPATIENT)
Dept: PEDIATRIC NEUROLOGY | Facility: CLINIC | Age: 23
End: 2018-02-26

## 2018-08-28 ENCOUNTER — APPOINTMENT (OUTPATIENT)
Dept: NEUROSURGERY | Facility: CLINIC | Age: 23
End: 2018-08-28

## 2019-06-10 ENCOUNTER — APPOINTMENT (OUTPATIENT)
Dept: NEUROLOGY | Facility: CLINIC | Age: 24
End: 2019-06-10

## 2019-08-26 ENCOUNTER — APPOINTMENT (OUTPATIENT)
Dept: NEUROLOGY | Facility: CLINIC | Age: 24
End: 2019-08-26
Payer: COMMERCIAL

## 2019-08-26 VITALS
BODY MASS INDEX: 32.95 KG/M2 | WEIGHT: 205 LBS | SYSTOLIC BLOOD PRESSURE: 135 MMHG | DIASTOLIC BLOOD PRESSURE: 79 MMHG | HEIGHT: 66 IN | HEART RATE: 79 BPM

## 2019-08-26 DIAGNOSIS — G40.814 LENNOX-GASTAUT SYNDROME, INTRACTABLE, W/OUT STATUS EPILEPTICUS: ICD-10-CM

## 2019-08-26 PROCEDURE — 99214 OFFICE O/P EST MOD 30 MIN: CPT

## 2019-08-26 NOTE — PHYSICAL EXAM
[FreeTextEntry1] : Constitutional: well-appearing, NID\par HEENT: normocephalic, no dysmorphic facial features, no ocular abnormalities, no TPC\par Chest: lungs clear, heart sounds regular in rate and rhythm\par MS: alert, poorly-related, non-verbal, follows some simple instructions well\par CN: full extraocular movements, no facial asymmetry or weakness, hears verbal instructions without difficulty\par Motor: Normal functional strength: favors left, decreased arm swing R when walking, R foot tends to turn out as well \par Sensation: localizes LT\par Coordination: no gait ataxia, no limb dysmetria.

## 2019-08-26 NOTE — ASSESSMENT
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 \par \par Plan:\par 1. Trial of Epidiolex 200mg PO BID (His seizures and clinical presentation is similar in severity to patients with LGS, so a trial of Epidiolex would be appropriate)\par 2. Will monitor for adverse effects of concomitant use of Onfi, father will notify me if excessive sedation, etc occurs.\par 3. Continue current doses of Lamotrigine and Onfi\par 4. Family agreed with plan\par 5. Follow up in 3 months.\par \par Surinder Fleming MD\par [facility]\par \par Time Spent: 25 minutes taking history, performing examination, and counseling on diagnosis and management.

## 2019-08-26 NOTE — HISTORY OF PRESENT ILLNESS
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 \par \par Since last visit, he continues to have 1-2 GTC seizures per month, and 2-3 focal seizures with impaired awareness per month.\par \par He has not started a trial of CBD yet, but his father in interested.  His seizures and clinical presentation is similar in severity to patients with LGS, so a trial of Epidiolex would be appropriate.\par \par EMU evaluation revealed rare right frontal SW, but no seizures were recorded.\par \par Current AEDs:\par Lamotrigine 250mg PO BID\par Onfi 20mg qAM and 30mg qPM\par \par Previous AEDs:\par OXC\par VPA\par LEV\par CBZ - allergy\par PHT - allergy\par PhB - allergy\par

## 2019-09-04 ENCOUNTER — RX RENEWAL (OUTPATIENT)
Age: 24
End: 2019-09-04

## 2019-10-02 ENCOUNTER — RX RENEWAL (OUTPATIENT)
Age: 24
End: 2019-10-02

## 2019-10-08 ENCOUNTER — RX RENEWAL (OUTPATIENT)
Age: 24
End: 2019-10-08

## 2019-11-14 ENCOUNTER — RX RENEWAL (OUTPATIENT)
Age: 24
End: 2019-11-14

## 2019-12-30 ENCOUNTER — MOBILE ON CALL (OUTPATIENT)
Age: 24
End: 2019-12-30

## 2020-08-31 ENCOUNTER — APPOINTMENT (OUTPATIENT)
Dept: NEUROLOGY | Facility: CLINIC | Age: 25
End: 2020-08-31

## 2020-09-24 ENCOUNTER — RX RENEWAL (OUTPATIENT)
Age: 25
End: 2020-09-24

## 2020-11-09 ENCOUNTER — APPOINTMENT (OUTPATIENT)
Dept: NEUROLOGY | Facility: CLINIC | Age: 25
End: 2020-11-09
Payer: COMMERCIAL

## 2020-11-09 VITALS
WEIGHT: 214 LBS | BODY MASS INDEX: 34.39 KG/M2 | SYSTOLIC BLOOD PRESSURE: 124 MMHG | DIASTOLIC BLOOD PRESSURE: 79 MMHG | HEART RATE: 83 BPM | HEIGHT: 66 IN

## 2020-11-09 VITALS — TEMPERATURE: 98.2 F

## 2020-11-09 PROCEDURE — 99214 OFFICE O/P EST MOD 30 MIN: CPT

## 2020-11-09 PROCEDURE — 99072 ADDL SUPL MATRL&STAF TM PHE: CPT

## 2020-11-10 NOTE — ASSESSMENT
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  Seizure frequency reduced with addition of Epidiolex.\par \par Plan:\par 1. Increase Epidiolex 250mg PO BID \par 2. Will monitor for adverse effects of concomitant use of Onfi, father will notify me if excessive sedation, etc occurs.\par 3. Continue current doses of Lamotrigine and Onfi\par 4. Family agreed with plan\par 5. Follow up in 6 months.\par \par Surinder Fleming MD\par [facility]\par \par Greater than 50% of the encounter was spent on counseling and coordination of care discussing treatment options. We have talked about appropriate follow up, and I have spent 25 minutes of face to face time with the patient.\par

## 2020-11-10 NOTE — HISTORY OF PRESENT ILLNESS
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 \par \par His father reports a significant improvement in seizure control since the addition of Epidiolex.  \par \par Seizure frequency: Focal seizures occur once every 3-4 weeks (previously occurring 2-3 times per month), and GTC seizures occur once every 2-3 months (previously occurring 1-2 times per month).\par \par Current AEDs:\par Lamotrigine 250mg PO BID\par Onfi 20mg qAM and 30mg qPM\par Epidiolex 200mg PO BID\par \par Previous AEDs:\par OXC\par VPA\par LEV\par CBZ - allergy\par PHT - allergy\par PhB - allergy

## 2020-11-11 ENCOUNTER — RX CHANGE (OUTPATIENT)
Age: 25
End: 2020-11-11

## 2020-11-11 RX ORDER — MIDAZOLAM 5 MG/.1ML
5 SPRAY NASAL
Qty: 2 | Refills: 1 | Status: DISCONTINUED | COMMUNITY
Start: 2020-11-10 | End: 2020-11-11

## 2020-11-13 LAB
ALBUMIN SERPL ELPH-MCNC: 4.9 G/DL
ALP BLD-CCNC: 69 U/L
ALT SERPL-CCNC: 37 U/L
ANION GAP SERPL CALC-SCNC: 14 MMOL/L
AST SERPL-CCNC: 21 U/L
BASOPHILS # BLD AUTO: 0.05 K/UL
BASOPHILS NFR BLD AUTO: 0.8 %
BILIRUB SERPL-MCNC: 0.2 MG/DL
BUN SERPL-MCNC: 10 MG/DL
CALCIUM SERPL-MCNC: 10.4 MG/DL
CHLORIDE SERPL-SCNC: 101 MMOL/L
CO2 SERPL-SCNC: 23 MMOL/L
CREAT SERPL-MCNC: 0.87 MG/DL
EOSINOPHIL # BLD AUTO: 0.14 K/UL
EOSINOPHIL NFR BLD AUTO: 2.3 %
GLUCOSE SERPL-MCNC: 80 MG/DL
HCT VFR BLD CALC: 46 %
HGB BLD-MCNC: 14.1 G/DL
IMM GRANULOCYTES NFR BLD AUTO: 0.3 %
LYMPHOCYTES # BLD AUTO: 2.12 K/UL
LYMPHOCYTES NFR BLD AUTO: 34.8 %
MAN DIFF?: NORMAL
MCHC RBC-ENTMCNC: 23 PG
MCHC RBC-ENTMCNC: 30.7 GM/DL
MCV RBC AUTO: 74.9 FL
MONOCYTES # BLD AUTO: 0.74 K/UL
MONOCYTES NFR BLD AUTO: 12.2 %
NEUTROPHILS # BLD AUTO: 3.02 K/UL
NEUTROPHILS NFR BLD AUTO: 49.6 %
PLATELET # BLD AUTO: 222 K/UL
POTASSIUM SERPL-SCNC: 4.5 MMOL/L
PROT SERPL-MCNC: 7.2 G/DL
RBC # BLD: 6.14 M/UL
RBC # FLD: 16.7 %
SODIUM SERPL-SCNC: 138 MMOL/L
WBC # FLD AUTO: 6.09 K/UL

## 2020-11-16 LAB — LAMOTRIGINE SERPL-MCNC: 5.6 MCG/ML

## 2020-11-20 LAB
CLOBAZAM + NOR PNL SERPL: 1056 NG/ML
DESMETHYLCLOBAZAM: 7633 NG/ML

## 2020-11-20 RX ORDER — MIDAZOLAM 5 MG/.1ML
5 SPRAY NASAL
Qty: 1 | Refills: 2 | Status: ACTIVE | COMMUNITY
Start: 2020-11-11 | End: 1900-01-01

## 2021-03-08 ENCOUNTER — RX RENEWAL (OUTPATIENT)
Age: 26
End: 2021-03-08

## 2021-05-10 ENCOUNTER — APPOINTMENT (OUTPATIENT)
Dept: NEUROLOGY | Facility: CLINIC | Age: 26
End: 2021-05-10

## 2022-03-09 NOTE — PATIENT PROFILE ADULT. - PT NEEDS ASSIST
Outreach attempt was made to schedule a Medicare Wellness Visit. This was the first attempt. Contact was not made, left message.   yes

## 2022-03-28 ENCOUNTER — APPOINTMENT (OUTPATIENT)
Dept: NEUROLOGY | Facility: CLINIC | Age: 27
End: 2022-03-28
Payer: MEDICAID

## 2022-03-28 VITALS
WEIGHT: 225 LBS | SYSTOLIC BLOOD PRESSURE: 133 MMHG | DIASTOLIC BLOOD PRESSURE: 85 MMHG | HEIGHT: 66 IN | BODY MASS INDEX: 36.16 KG/M2 | HEART RATE: 76 BPM

## 2022-03-28 PROCEDURE — 99214 OFFICE O/P EST MOD 30 MIN: CPT

## 2022-03-28 NOTE — ASSESSMENT
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 Seizure frequency reduced with addition of Epidiolex.\par \par Plan:\par 1. Increase Epidiolex 250mg PO BID \par 2. Will monitor for adverse effects of concomitant use of Onfi, father will notify me if excessive sedation, etc occurs.\par 3. Continue current doses of Lamotrigine and Onfi\par 4. Family agreed with plan\par 5. Follow up in 6 months.\par \par Surinder Fleming MD\par [facility]\par \par Greater than 50% of the encounter was spent on counseling and coordination of care discussing treatment options. We have talked about appropriate follow up, and I have spent 25 minutes of face to face time with the patient.

## 2022-03-28 NOTE — HISTORY OF PRESENT ILLNESS
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 Seizure frequency reduced with addition of Epidiolex.\par \par Since last visit, seizure frequency has continued to decrease over time.\par \par Seizure frequency: Focal seizures occur once every 4-6 weeks (previously occurring 2-3 times per month), and GTC seizures occur once every 4 months (previously occurring 1-2 times per month).\par \par No side effects from current regimen.  Last visit, we planned to increased Epidiolex to 2.5 mL BID, but father misunderstood and continued 2 mL BID.\par \par Father is considering putting him a day program with their , but has not decided yet.\par \par Current AEDs:\par Lamotrigine 250mg PO BID\par Onfi 20mg qAM and 30mg qPM\par Epidiolex 200mg PO BID\par \par Previous AEDs:\par OXC\par VPA\par LEV\par CBZ - allergy\par PHT - allergy\par PhB - allergy \par

## 2022-05-27 ENCOUNTER — RX RENEWAL (OUTPATIENT)
Age: 27
End: 2022-05-27

## 2022-09-26 ENCOUNTER — APPOINTMENT (OUTPATIENT)
Dept: NEUROLOGY | Facility: CLINIC | Age: 27
End: 2022-09-26

## 2022-10-20 NOTE — PROGRESS NOTE ADULT - SUBJECTIVE AND OBJECTIVE BOX
S: the patient is visited at the bedside, stable and appears comfortable.    O:  Vital Signs Last 24 Hrs  T(C): 36.7 (09 Feb 2018 11:58), Max: 36.8 (08 Feb 2018 15:44)  T(F): 98.1 (09 Feb 2018 11:58), Max: 98.2 (08 Feb 2018 15:44)  HR: 69 (09 Feb 2018 11:58) (68 - 96)  BP: 119/64 (09 Feb 2018 11:58) (107/70 - 120/72)  BP(mean): --  RR: 18 (09 Feb 2018 11:58) (17 - 18)  SpO2: 96% (09 Feb 2018 11:58) (94% - 97%)    MEDICATIONS  (STANDING):  Clobazam 20 milliGRAM(s) Oral daily  Clobazam 25 milliGRAM(s) Oral at bedtime  enoxaparin Injectable 40 milliGRAM(s) SubCutaneous every 24 hours  lamoTRIgine 200 milliGRAM(s) Oral two times a day  lamoTRIgine 50 milliGRAM(s) Oral at bedtime    < from: MRI Head w/o Cont (08.14.17 @ 10:29) >  IMPRESSION:  No evidence for mesial temporal sclerosis.  No evidence for congenital abnormality or evidence for lesion.  Stable cerebral atrophy.      < end of copied text > no

## 2022-12-19 ENCOUNTER — RX RENEWAL (OUTPATIENT)
Age: 27
End: 2022-12-19

## 2023-03-06 ENCOUNTER — RX RENEWAL (OUTPATIENT)
Age: 28
End: 2023-03-06

## 2023-03-31 ENCOUNTER — RX RENEWAL (OUTPATIENT)
Age: 28
End: 2023-03-31

## 2023-08-09 NOTE — ASU PATIENT PROFILE, PEDIATRIC - MEDICATIONS BROUGHT TO HOSPITAL, PROFILE
"[facility]  Department of Surgery        REFERRING PROVIDER:   No referring provider defined for this encounter.    Chief Complaint: Follow-up    History of Present Illness: Ana Aguila is a 66 y.o. female who presents today with bilateral breast pain. Patient reports she had a right pneumothorax requiring chest tube placement. Per patient, since then, she has had tenderness and "mass-like" are near previous insertion site. Also reports feeling "knots" of the left breast, near her underarm. This is not a new issue, she has had this for years.  She was last seen in clinic by Dr. Garcia on 3/10/2020. Patient denies breast-related skin changes, nipple discharge and nipple retraction.  No other breast-related concerns.     Pt's 3/2019 echo was normal per cardiologist's documentation.     Breast Imagin/15/2022 Mammo Digital Screening Bilat w/ [facility]     History:  Patient is 70 y.o. and is seen for a screening mammogram.        Films Compared:  Prior images (if available) were compared.      Findings:   This procedure was performed using tomosynthesis.   Computer-aided detection was utilized in the interpretation of this examination.     The breasts have scattered areas of fibroglandular density. There is no evidence of suspicious masses, microcalcifications or architectural distortion. Findings are stable.         Impression:   No mammographic evidence of malignancy.     BI-RADS Category 1: Negative     Recommendation:  Routine screening mammogram in 1 year is recommended.        Interval Breast History         Patient reports feeling dense breast tissue in 12oc regions of bilateral breasts.  First noticed within past few months.  Size not changing.     Patient reports bilateral axillary shooting pains.  Mild.  Onset a little less than 1 week ago.  Began on L first, and then R.  More intense on L.  These pains come and go.  Heating pad has helped.  Pt has been doing an exercise regimen for the past 2 " "weeks that she suspects could be the cause of the pains.     Patient denies palpable breast mass, breast-related skin changes, nipple discharge and nipple retraction.  No other breast-related concerns.     Pt's 3/2019 echo was normal per cardiologist's documentation.     GYN History:  Age of menarche:  16 y/o  , first live birth at 18 y/o  Uterus and ovaries:  s/p hysterectomy at 37 y/o, pt unsure whether ovaries remain intact  Menopause:  37 y/o  HRT usage:  never     Other Oncologic History:  Personal history of other cancer not abovementioned:  no  Personal history of genetic testing:  no     Social History:  Tobacco use:  quit smoking around over 20 years ago     Family Oncologic History:     Ashkenazi Buddhist ancestry:  no           Family History   Problem Relation Age of Onset    Cancer Mother           lung    Breast cancer Maternal Grandmother           unk age of onset    Breast cancer Daughter 43         negative genetic testing, unilateral breast ca    Breast cancer Other 44         thinks negative genetic testing, bilat ca    Ovarian cancer Neg Hx           Patient's Breast Cancer Risk Assessment Scores:  the patient's breast cancer risk assessment scores were calculated 19 as follows:  Tyrer-Cuzick lifetime risk:  9.4%  Valerie model 5-year risk:  2.6%        Review of Systems - See HPI.  Negative for chest pain, unexplained fatigue, recurrent infections, or unexplained weight loss.  Objective:     /63 (BP Location: Left arm, Patient Position: Sitting, BP Method: Medium (Automatic))   Pulse 64   Ht 5' 5" (1.651 m)   Wt 71.2 kg (157 lb)   LMP  (LMP Unknown)   BMI 26.13 kg/m²     Physical Exam   Vitals reviewed.  Constitutional: She is oriented to person, place, and time.   Eyes: Right eye exhibits no discharge. Left eye exhibits no discharge.   Pulmonary/Chest: No respiratory distress. She has no wheezes. Right breast exhibits tenderness. Right breast exhibits no inverted nipple, no " mass, no nipple discharge and no skin change. Left breast exhibits tenderness. Left breast exhibits no inverted nipple, no mass, no nipple discharge and no skin change.       Abdominal: Normal appearance.   Musculoskeletal:      Comments: Wheelchair  Lymphadenopathy:      Cervical: No cervical adenopathy.     Neurological: She is alert and oriented to person, place, and time.   Skin: Skin is warm and dry. No erythema. No pallor.           Assessment:   This is 70 year old female who presents today for bilateral breast pain L>R, no discrete mass appreciated today. Diagnostic mammogram today was negative.        Plan:   We discussed there was nothing concerning on exam or imaging today. Reassurance given   Discussed OTC therapies such as acetaminophen or nonsteroidal anti-inflammatory drugs (NSAIDs). They can both be used to relieve breast pain. Topical NSAIDS such as OTC Diclofenac (Volteran) gel can be used. Other recommendations include use of a supportive bra. Wearing a soft, supportive bra at night prevents the breasts from pulling down on the chest wall. Some women obtain relief from application of warm compresses or ice packs.   Discussed lifestyle recommendations such as decrease or elimination of caffeine. Also low-fat diet, high complex carbohydrate diet has been shown to be effective.    Return to clinic in 1 year for  screening mammogram   Referral to dermatology for RUE lesion     The patient is in agreement with the plan. Questions were encouraged and answered to patient's satisfaction. Ana will call our office with any questions or concerns.       Total time spent with the patient: 45.  35 minutes of face to face consultation and 10 minutes of chart review and coordination of care.         no

## 2023-10-23 ENCOUNTER — APPOINTMENT (OUTPATIENT)
Dept: NEUROLOGY | Facility: CLINIC | Age: 28
End: 2023-10-23
Payer: MEDICAID

## 2023-10-23 VITALS
BODY MASS INDEX: 36.16 KG/M2 | SYSTOLIC BLOOD PRESSURE: 112 MMHG | DIASTOLIC BLOOD PRESSURE: 77 MMHG | HEIGHT: 66 IN | WEIGHT: 225 LBS | HEART RATE: 76 BPM

## 2023-10-23 PROCEDURE — 99214 OFFICE O/P EST MOD 30 MIN: CPT

## 2023-10-23 RX ORDER — ZONISAMIDE 100 MG/1
100 CAPSULE ORAL
Qty: 270 | Refills: 1 | Status: ACTIVE | COMMUNITY
Start: 2023-10-23 | End: 1900-01-01

## 2024-01-31 RX ORDER — CANNABIDIOL 100 MG/ML
100 SOLUTION ORAL
Qty: 2 | Refills: 5 | Status: ACTIVE | COMMUNITY
Start: 2019-08-26 | End: 1900-01-01

## 2024-02-07 RX ORDER — AMOXICILLIN 250 MG/5ML
1 SUSPENSION, RECONSTITUTED, ORAL (ML) ORAL
Refills: 0 | DISCHARGE
Start: 2024-02-07

## 2024-02-10 ENCOUNTER — INPATIENT (INPATIENT)
Facility: HOSPITAL | Age: 29
LOS: 2 days | Discharge: HOME | DRG: 53 | End: 2024-02-13
Attending: HOSPITALIST | Admitting: FAMILY MEDICINE
Payer: MEDICAID

## 2024-02-10 VITALS — WEIGHT: 205.03 LBS | HEIGHT: 62 IN

## 2024-02-10 DIAGNOSIS — R56.9 UNSPECIFIED CONVULSIONS: ICD-10-CM

## 2024-02-10 LAB
ALBUMIN SERPL ELPH-MCNC: 4.2 G/DL — SIGNIFICANT CHANGE UP (ref 3.3–5)
ALP SERPL-CCNC: 87 U/L — SIGNIFICANT CHANGE UP (ref 40–120)
ALT FLD-CCNC: 32 U/L — SIGNIFICANT CHANGE UP (ref 12–78)
ANION GAP SERPL CALC-SCNC: 2 MMOL/L — LOW (ref 5–17)
ANISOCYTOSIS BLD QL: SLIGHT — SIGNIFICANT CHANGE UP
APPEARANCE UR: ABNORMAL
AST SERPL-CCNC: 28 U/L — SIGNIFICANT CHANGE UP (ref 15–37)
BACTERIA # UR AUTO: NEGATIVE /HPF — SIGNIFICANT CHANGE UP
BASOPHILS # BLD AUTO: 0.03 K/UL — SIGNIFICANT CHANGE UP (ref 0–0.2)
BASOPHILS NFR BLD AUTO: 0.3 % — SIGNIFICANT CHANGE UP (ref 0–2)
BILIRUB SERPL-MCNC: 0.4 MG/DL — SIGNIFICANT CHANGE UP (ref 0.2–1.2)
BILIRUB UR-MCNC: NEGATIVE — SIGNIFICANT CHANGE UP
BUN SERPL-MCNC: 9 MG/DL — SIGNIFICANT CHANGE UP (ref 7–23)
CALCIUM SERPL-MCNC: 10 MG/DL — SIGNIFICANT CHANGE UP (ref 8.5–10.1)
CHLORIDE SERPL-SCNC: 110 MMOL/L — HIGH (ref 96–108)
CO2 SERPL-SCNC: 26 MMOL/L — SIGNIFICANT CHANGE UP (ref 22–31)
COLOR SPEC: YELLOW — SIGNIFICANT CHANGE UP
CREAT SERPL-MCNC: 1.11 MG/DL — SIGNIFICANT CHANGE UP (ref 0.5–1.3)
DIFF PNL FLD: NEGATIVE — SIGNIFICANT CHANGE UP
EGFR: 94 ML/MIN/1.73M2 — SIGNIFICANT CHANGE UP
EOSINOPHIL # BLD AUTO: 0 K/UL — SIGNIFICANT CHANGE UP (ref 0–0.5)
EOSINOPHIL NFR BLD AUTO: 0 % — SIGNIFICANT CHANGE UP (ref 0–6)
GLUCOSE SERPL-MCNC: 105 MG/DL — HIGH (ref 70–99)
GLUCOSE UR QL: NEGATIVE MG/DL — SIGNIFICANT CHANGE UP
HCT VFR BLD CALC: 44 % — SIGNIFICANT CHANGE UP (ref 39–50)
HGB BLD-MCNC: 13.5 G/DL — SIGNIFICANT CHANGE UP (ref 13–17)
HYPOCHROMIA BLD QL: SLIGHT — SIGNIFICANT CHANGE UP
IMM GRANULOCYTES NFR BLD AUTO: 0.3 % — SIGNIFICANT CHANGE UP (ref 0–0.9)
KETONES UR-MCNC: ABNORMAL MG/DL
LEUKOCYTE ESTERASE UR-ACNC: NEGATIVE — SIGNIFICANT CHANGE UP
LYMPHOCYTES # BLD AUTO: 1.89 K/UL — SIGNIFICANT CHANGE UP (ref 1–3.3)
LYMPHOCYTES # BLD AUTO: 17.6 % — SIGNIFICANT CHANGE UP (ref 13–44)
MANUAL SMEAR VERIFICATION: SIGNIFICANT CHANGE UP
MCHC RBC-ENTMCNC: 22.8 PG — LOW (ref 27–34)
MCHC RBC-ENTMCNC: 30.7 GM/DL — LOW (ref 32–36)
MCV RBC AUTO: 74.3 FL — LOW (ref 80–100)
MICROCYTES BLD QL: SLIGHT — SIGNIFICANT CHANGE UP
MONOCYTES # BLD AUTO: 0.76 K/UL — SIGNIFICANT CHANGE UP (ref 0–0.9)
MONOCYTES NFR BLD AUTO: 7.1 % — SIGNIFICANT CHANGE UP (ref 2–14)
NEUTROPHILS # BLD AUTO: 8 K/UL — HIGH (ref 1.8–7.4)
NEUTROPHILS NFR BLD AUTO: 74.7 % — SIGNIFICANT CHANGE UP (ref 43–77)
NITRITE UR-MCNC: NEGATIVE — SIGNIFICANT CHANGE UP
OVALOCYTES BLD QL SMEAR: SLIGHT — SIGNIFICANT CHANGE UP
PH UR: 6 — SIGNIFICANT CHANGE UP (ref 5–8)
PLAT MORPH BLD: NORMAL — SIGNIFICANT CHANGE UP
PLATELET # BLD AUTO: 273 K/UL — SIGNIFICANT CHANGE UP (ref 150–400)
POIKILOCYTOSIS BLD QL AUTO: SLIGHT — SIGNIFICANT CHANGE UP
POTASSIUM SERPL-MCNC: 5 MMOL/L — SIGNIFICANT CHANGE UP (ref 3.5–5.3)
POTASSIUM SERPL-SCNC: 5 MMOL/L — SIGNIFICANT CHANGE UP (ref 3.5–5.3)
PROT SERPL-MCNC: 8.1 GM/DL — SIGNIFICANT CHANGE UP (ref 6–8.3)
PROT UR-MCNC: SIGNIFICANT CHANGE UP MG/DL
RBC # BLD: 5.92 M/UL — HIGH (ref 4.2–5.8)
RBC # FLD: 15.8 % — HIGH (ref 10.3–14.5)
RBC BLD AUTO: ABNORMAL
RBC CASTS # UR COMP ASSIST: 1 /HPF — SIGNIFICANT CHANGE UP (ref 0–4)
SODIUM SERPL-SCNC: 138 MMOL/L — SIGNIFICANT CHANGE UP (ref 135–145)
SP GR SPEC: 1.02 — SIGNIFICANT CHANGE UP (ref 1–1.03)
SQUAMOUS # UR AUTO: 2 /HPF — SIGNIFICANT CHANGE UP (ref 0–5)
UROBILINOGEN FLD QL: 1 MG/DL — SIGNIFICANT CHANGE UP (ref 0.2–1)
WBC # BLD: 10.71 K/UL — HIGH (ref 3.8–10.5)
WBC # FLD AUTO: 10.71 K/UL — HIGH (ref 3.8–10.5)
WBC UR QL: 1 /HPF — SIGNIFICANT CHANGE UP (ref 0–5)

## 2024-02-10 PROCEDURE — 80048 BASIC METABOLIC PNL TOTAL CA: CPT

## 2024-02-10 PROCEDURE — 83036 HEMOGLOBIN GLYCOSYLATED A1C: CPT

## 2024-02-10 PROCEDURE — 95700 EEG CONT REC W/VID EEG TECH: CPT

## 2024-02-10 PROCEDURE — 99285 EMERGENCY DEPT VISIT HI MDM: CPT

## 2024-02-10 PROCEDURE — 99223 1ST HOSP IP/OBS HIGH 75: CPT

## 2024-02-10 PROCEDURE — 95714 VEEG EA 12-26 HR UNMNTR: CPT

## 2024-02-10 PROCEDURE — 83735 ASSAY OF MAGNESIUM: CPT

## 2024-02-10 PROCEDURE — 80203 DRUG SCREEN QUANT ZONISAMIDE: CPT

## 2024-02-10 PROCEDURE — 80061 LIPID PANEL: CPT

## 2024-02-10 PROCEDURE — 70450 CT HEAD/BRAIN W/O DYE: CPT | Mod: 26,MA

## 2024-02-10 PROCEDURE — 81001 URINALYSIS AUTO W/SCOPE: CPT

## 2024-02-10 PROCEDURE — 80053 COMPREHEN METABOLIC PANEL: CPT

## 2024-02-10 PROCEDURE — 36415 COLL VENOUS BLD VENIPUNCTURE: CPT

## 2024-02-10 PROCEDURE — 85027 COMPLETE CBC AUTOMATED: CPT

## 2024-02-10 RX ORDER — CANNABIDIOL 100 MG/ML
2.5 SOLUTION ORAL
Refills: 0 | DISCHARGE

## 2024-02-10 RX ORDER — SODIUM CHLORIDE 9 MG/ML
3 INJECTION INTRAMUSCULAR; INTRAVENOUS; SUBCUTANEOUS ONCE
Refills: 0 | Status: COMPLETED | OUTPATIENT
Start: 2024-02-10 | End: 2024-02-10

## 2024-02-10 RX ORDER — ACETAMINOPHEN 500 MG
650 TABLET ORAL EVERY 6 HOURS
Refills: 0 | Status: DISCONTINUED | OUTPATIENT
Start: 2024-02-10 | End: 2024-02-13

## 2024-02-10 RX ORDER — LANOLIN ALCOHOL/MO/W.PET/CERES
3 CREAM (GRAM) TOPICAL AT BEDTIME
Refills: 0 | Status: DISCONTINUED | OUTPATIENT
Start: 2024-02-10 | End: 2024-02-13

## 2024-02-10 RX ORDER — ONDANSETRON 8 MG/1
4 TABLET, FILM COATED ORAL EVERY 6 HOURS
Refills: 0 | Status: DISCONTINUED | OUTPATIENT
Start: 2024-02-10 | End: 2024-02-13

## 2024-02-10 RX ORDER — POLYETHYLENE GLYCOL 3350 17 G/17G
1 POWDER, FOR SOLUTION ORAL
Qty: 0 | Refills: 0 | DISCHARGE

## 2024-02-10 RX ORDER — CANNABIDIOL 100 MG/ML
250 SOLUTION ORAL
Refills: 0 | Status: DISCONTINUED | OUTPATIENT
Start: 2024-02-10 | End: 2024-02-13

## 2024-02-10 RX ORDER — ENOXAPARIN SODIUM 100 MG/ML
40 INJECTION SUBCUTANEOUS EVERY 12 HOURS
Refills: 0 | Status: DISCONTINUED | OUTPATIENT
Start: 2024-02-10 | End: 2024-02-13

## 2024-02-10 RX ORDER — ZONISAMIDE 100 MG
300 CAPSULE ORAL AT BEDTIME
Refills: 0 | Status: DISCONTINUED | OUTPATIENT
Start: 2024-02-10 | End: 2024-02-11

## 2024-02-10 RX ADMIN — CANNABIDIOL 250 MILLIGRAM(S): 100 SOLUTION ORAL at 22:57

## 2024-02-10 RX ADMIN — Medication 2 MILLIGRAM(S): at 13:45

## 2024-02-10 RX ADMIN — Medication 300 MILLIGRAM(S): at 22:57

## 2024-02-10 RX ADMIN — SODIUM CHLORIDE 3 MILLILITER(S): 9 INJECTION INTRAMUSCULAR; INTRAVENOUS; SUBCUTANEOUS at 12:31

## 2024-02-10 NOTE — ED ADULT NURSE NOTE - OBJECTIVE STATEMENT
Patient is alert, nonverbal, does not appear in acute distress, continuous O2 sat and cardiac monitor in place. Patients father and cousin are at bedside and states patient had a lot of seizures yesterday and fell once during seizure. Father states he did not witness fall and does not know is patient injured his head. Patient appears stable without acute distress. Oxygen device and suction device set up in case of seizure. Side rails padded with blankets. Patient has a history of seizures but more seizures occurred yesterday on 2/9/24.

## 2024-02-10 NOTE — ED ADULT NURSE NOTE - NSFALLOOBATTEMPT_ED_ALL_ED
Physical Therapy Discharge Summary    Reason for therapy discharge:    Change in medical status.    Progress towards therapy goal(s). See goals on Care Plan in [facility] electronic health record for goal details.  Goals not met.  Barriers to achieving goals:   discharge from facility and change in medical condition impacting patients mobility. He has declined in mobility despite being a full participant in therapy sessions. When he arrived he could ambulate ~20' with Front WW and SBA. Sit to stand was SBA. Currently he is unable to ambulate and is max A x 3 to  // bars..    Therapy recommendation(s):    Continued therapy is recommended.  Rationale/Recommendations:  Physical therapy is recommended once it is appropriate pending medical status..         No

## 2024-02-10 NOTE — H&P ADULT - NSHPPHYSICALEXAM_GEN_ALL_CORE
GENERAL: NAD, comfortable at bedside   HEAD:  Atraumatic, Normocephalic  EYES: EOMI, PERRL, conjunctiva and sclera clear  NECK: Supple, No JVD  CHEST/LUNG: Clear to auscultation bilaterally; No wheezes, rales or rhonchi  HEART: Regular rate and rhythm; No murmurs, rubs, or gallops, (+)S1, S2  ABDOMEN: Soft, Nontender, Nondistended; Normal Bowel sounds   EXTREMITIES:  2+ Peripheral Pulses, No clubbing, cyanosis, or edema  PSYCH: normal mood and affect  NEUROLOGY: AAO, limited communication (baseline) non-focal  SKIN: No rashes or lesions

## 2024-02-10 NOTE — ED ADULT NURSE NOTE - CAS EDP DISCH DISPOSITION ADMI
Medtronic Adapta (D) Remote PPM Device Check  AP: 42 % : <1 %  Mode: DDDR        Presenting Rhythm: AP/VS  Heart Rate: Adequate rates per histogram  Sensing: Stable    Pacing Threshold: Stable    Impedance: Stable  Battery Status: 9.5-13.5 years  Atrial Arrhythmia: 3 mode switch episodes comprising <1% of the time. 1 EGM available shows PAF. Longest episode lasted 2 hours 34 minutes, ventricular rates 80-200bpm during mode switch. Not currently taking AC. Will message Dr. Witt with findings.   Ventricular Arrhythmia: None     Care Plan: F/u PPM [facility] q 3 months. Gave results over the phone.  Porfirio,CVT                    
[facility]

## 2024-02-10 NOTE — H&P ADULT - HISTORY OF PRESENT ILLNESS
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 As per father, 2 months ago his seizure medications was switched to Zonegran and since then he has been having increase seizure activity worsen this week. Yesterday, patient had 14 seizures, this morning 3 episodes prior to ED arrival and 1 episode in the ED. Tonic-Clonic seizures lasting 1-2 minutes and resolved spontaneously. Most of the time patient returned to baseline, or may have mild confusion. Yesterday patient fell out of bed onto carpeted floor during a seizure episode, no trauma.  Father is unsure why the medication was switched, has not been able to see a neurologist lately due to insurance issues. Has been complaint with medication. No other complaints. Patient is AAO with limited communication(baseline)    CT-HEAD: No evidence of acute intracranial hemorrhage, mass-effect or calvarial fracture.  Allergies: Carbamezapine, Dilantin, Keppra, Phenobarbital. As per Father, those medications were initiated while patient was in a come (for 3 months), developed rash.   PCP: Dr. Tripathi.

## 2024-02-10 NOTE — H&P ADULT - PROBLEM SELECTOR PLAN 1
Multiple seizure episodes  Medication was switched 2 months ago to Zonegran   CT-HEAD: No evidence of acute intracranial hemorrhage, mass-effect or calvarial fracture  - Tele   - Seizure precaution   - Ativan   - Zonegran   - Epidiolex   - DVT prophylaxis   - EEG   - Neuro consult

## 2024-02-10 NOTE — ED PROVIDER NOTE - PHYSICAL EXAMINATION
Constitutional: NAD, awake, nonverbal (baseline)  Eyes: PERRL EOMI  Head: Normocephalic atraumatic  Mouth: MMM  Cardiac: regular rate and rhythm  Resp: Lungs CTAB  GI: Abd s/nd/nt  Neuro: CN2-12 grossly intact, RILEY x 4, Nonverbal, follows some simple commands.   Skin: No visible rashes

## 2024-02-10 NOTE — PHARMACOTHERAPY INTERVENTION NOTE - COMMENTS
Medication history complete. Medications and allergies reviewed with patient's father, Tod at bedside and confirmed with .

## 2024-02-10 NOTE — ED ADULT NURSE NOTE - NSFALLHARMRISKINTERV_ED_ALL_ED

## 2024-02-10 NOTE — ED PROVIDER NOTE - SCRIBE NAME
Nursing Note by Paulette Farley RN at 11/10/18 07:45 AM     Author:  Paulette Farley RN Service:  (none) Author Type:  Registered Nurse     Filed:  11/10/18 07:52 AM Encounter Date:  11/10/2018 Status:  Signed     :  Paulette Farley RN (Registered Nurse)            7:45 AM  Chief Complaint    Patient presents with    • Abdominal Pain   • Dizziness   • Nausea   • Fatigue[LG1.1T]   pt states she has not had alcohol in a while. She states she had alcohol last night after taking xanax. She states she had 1/4 bottle of vodka. She states she became suicidal and self inflicted cut her left leg. States she is no longer suicidal. She c/o intermittent abdominal pain, nausea and feeling tired. Report was given to Dr. Jimenez.  Patient brought to exam room.  Electronically Signed by:    Paulette Farley RN , 11/10/2018  Patient's name,  and allergies verified with pt[LG1.1M].  Last visit with SABA JIMENEZ was on No match found  Last visit with [facility] was on No match found  Match done based on reference date of today 11/10/18  Ruddy Iqbal was offered treatment for pain control:[LG1.1T] No.   Wait for MD to assess.[LG1.1M]        Revision History        User Key Date/Time User Provider Type Action    > LG1.1 11/10/18 07:52 AM Paulette Farley RN Registered Nurse Sign    M - Manual, T - Template             Jhony Sanderson

## 2024-02-10 NOTE — ED ADULT TRIAGE NOTE - CHIEF COMPLAINT QUOTE
presents to ER brought in by family for 15 seizures yesterday. hx epilepsy on AED, has been compliant with medications. pt is non-verbal at baseline.

## 2024-02-10 NOTE — ED PROVIDER NOTE - OBJECTIVE STATEMENT
27 y/o male w/ a PMHx of epilepsy (since 1 per HIE, father states since age 9) s/p encephalitis on Cannabidiol, Onfi, recently switched to Zonegran x2 months ago, and global developmental delay presents to the ED BIB family for multiples seizures yesterday. Pt father states ever since the med change x2 months ago to Zonegran he has been having more seizures, 14 yesterday, 2 the day before. Pt father has difficulty getting a neuro appt d/t insurance issues, unsure why meds were changed. Seizures are tonic-clonic, usually last 1-2 minutes, and self resolve. One of the seizures from yesterday results in pt falling off the bed onto carpeted floor, no reported LOC. Pt acting normally besides than seizures, Pt has been compliant w/ meds per family. Hx limited secondary to nonverbal. Allergies: Carbamezapine, Dilantin, Keppra, Phenobarbital. PCP: Dr. Tripathi.

## 2024-02-10 NOTE — H&P ADULT - ASSESSMENT
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 As per father, 2 months ago his seizure medications was switched to Zonegran and since then he has been having increase seizure activity worsen this week. Yesterday, patient had 14 seizures, this morning 3 episodes prior to ED arrival and 1 episode in the ED.  Tonic-Clonic seizures lasting 1-2 minutes and resolved spontaneously.

## 2024-02-11 LAB
A1C WITH ESTIMATED AVERAGE GLUCOSE RESULT: 5.7 % — HIGH (ref 4–5.6)
ADD ON TEST-SPECIMEN IN LAB: SIGNIFICANT CHANGE UP
ALBUMIN SERPL ELPH-MCNC: 3.8 G/DL — SIGNIFICANT CHANGE UP (ref 3.3–5)
ALP SERPL-CCNC: 84 U/L — SIGNIFICANT CHANGE UP (ref 40–120)
ALT FLD-CCNC: 34 U/L — SIGNIFICANT CHANGE UP (ref 12–78)
ANION GAP SERPL CALC-SCNC: 5 MMOL/L — SIGNIFICANT CHANGE UP (ref 5–17)
AST SERPL-CCNC: 20 U/L — SIGNIFICANT CHANGE UP (ref 15–37)
BILIRUB SERPL-MCNC: 0.6 MG/DL — SIGNIFICANT CHANGE UP (ref 0.2–1.2)
BUN SERPL-MCNC: 9 MG/DL — SIGNIFICANT CHANGE UP (ref 7–23)
CALCIUM SERPL-MCNC: 9.3 MG/DL — SIGNIFICANT CHANGE UP (ref 8.5–10.1)
CHLORIDE SERPL-SCNC: 108 MMOL/L — SIGNIFICANT CHANGE UP (ref 96–108)
CHOLEST SERPL-MCNC: 128 MG/DL — SIGNIFICANT CHANGE UP
CO2 SERPL-SCNC: 26 MMOL/L — SIGNIFICANT CHANGE UP (ref 22–31)
CREAT SERPL-MCNC: 0.92 MG/DL — SIGNIFICANT CHANGE UP (ref 0.5–1.3)
EGFR: 118 ML/MIN/1.73M2 — SIGNIFICANT CHANGE UP
ESTIMATED AVERAGE GLUCOSE: 117 MG/DL — HIGH (ref 68–114)
GLUCOSE SERPL-MCNC: 107 MG/DL — HIGH (ref 70–99)
HCT VFR BLD CALC: 40.4 % — SIGNIFICANT CHANGE UP (ref 39–50)
HDLC SERPL-MCNC: 40 MG/DL — LOW
HGB BLD-MCNC: 12.9 G/DL — LOW (ref 13–17)
LIPID PNL WITH DIRECT LDL SERPL: 75 MG/DL — SIGNIFICANT CHANGE UP
MAGNESIUM SERPL-MCNC: 1.9 MG/DL — SIGNIFICANT CHANGE UP (ref 1.6–2.6)
MCHC RBC-ENTMCNC: 23.5 PG — LOW (ref 27–34)
MCHC RBC-ENTMCNC: 31.9 GM/DL — LOW (ref 32–36)
MCV RBC AUTO: 73.5 FL — LOW (ref 80–100)
NON HDL CHOLESTEROL: 89 MG/DL — SIGNIFICANT CHANGE UP
PLATELET # BLD AUTO: 231 K/UL — SIGNIFICANT CHANGE UP (ref 150–400)
POTASSIUM SERPL-MCNC: 3.1 MMOL/L — LOW (ref 3.5–5.3)
POTASSIUM SERPL-SCNC: 3.1 MMOL/L — LOW (ref 3.5–5.3)
PROT SERPL-MCNC: 7.3 GM/DL — SIGNIFICANT CHANGE UP (ref 6–8.3)
RBC # BLD: 5.5 M/UL — SIGNIFICANT CHANGE UP (ref 4.2–5.8)
RBC # FLD: 14.6 % — HIGH (ref 10.3–14.5)
SODIUM SERPL-SCNC: 139 MMOL/L — SIGNIFICANT CHANGE UP (ref 135–145)
TRIGL SERPL-MCNC: 62 MG/DL — SIGNIFICANT CHANGE UP
WBC # BLD: 7.63 K/UL — SIGNIFICANT CHANGE UP (ref 3.8–10.5)
WBC # FLD AUTO: 7.63 K/UL — SIGNIFICANT CHANGE UP (ref 3.8–10.5)

## 2024-02-11 PROCEDURE — 99223 1ST HOSP IP/OBS HIGH 75: CPT

## 2024-02-11 PROCEDURE — 99233 SBSQ HOSP IP/OBS HIGH 50: CPT

## 2024-02-11 RX ORDER — ZONISAMIDE 100 MG
200 CAPSULE ORAL
Refills: 0 | Status: DISCONTINUED | OUTPATIENT
Start: 2024-02-11 | End: 2024-02-13

## 2024-02-11 RX ORDER — POTASSIUM CHLORIDE 20 MEQ
20 PACKET (EA) ORAL THREE TIMES A DAY
Refills: 0 | Status: DISCONTINUED | OUTPATIENT
Start: 2024-02-11 | End: 2024-02-12

## 2024-02-11 RX ADMIN — CANNABIDIOL 250 MILLIGRAM(S): 100 SOLUTION ORAL at 17:50

## 2024-02-11 RX ADMIN — Medication 200 MILLIGRAM(S): at 12:35

## 2024-02-11 RX ADMIN — CANNABIDIOL 250 MILLIGRAM(S): 100 SOLUTION ORAL at 08:52

## 2024-02-11 RX ADMIN — Medication 20 MILLIEQUIVALENT(S): at 21:03

## 2024-02-11 RX ADMIN — ENOXAPARIN SODIUM 40 MILLIGRAM(S): 100 INJECTION SUBCUTANEOUS at 21:02

## 2024-02-11 RX ADMIN — ENOXAPARIN SODIUM 40 MILLIGRAM(S): 100 INJECTION SUBCUTANEOUS at 10:14

## 2024-02-11 RX ADMIN — Medication 200 MILLIGRAM(S): at 21:02

## 2024-02-11 NOTE — CONSULT NOTE ADULT - SUBJECTIVE AND OBJECTIVE BOX
Neurology Consult requested by:   Patient is a 28y old  Male who presents with a chief complaint of Seizure (11 Feb 2024 09:59)     HPI:  28 year old man w/ a PMH of epilepsy (since 1 per HIE, father states since age 9) s/p encephalitis on Cannabidiol, global developmental delay presents to the ED for Seizure. As per father, 2 months ago his seizure medications was switched to Zonegran and since then he has been having increase seizure activity worsen this week. Yesterday, patient had 14 seizures, this morning 3 episodes prior to ED arrival and 1 episode in the ED. Tonic-Clonic seizures lasting 1-2 minutes and resolved spontaneously. Most of the time patient returned to baseline, or may have mild confusion. Yesterday patient fell out of bed onto carpeted floor during a seizure episode, no trauma.  Father is unsure why the medication was switched, has not been able to see a neurologist lately due to insurance issues. Has been compliant with medication. No other complaints. Patient is AAO with limited communication(baseline)    CT-HEAD: No evidence of acute intracranial hemorrhage, mass-effect or calvarial fracture.  Allergies: Carbamezapine, Dilantin, Keppra, Phenobarbital. As per Father, those medications were initiated while patient was in a come (for 3 months), developed rash.   PCP: Dr. Tripathi.   (10 Feb 2024 21:57)      PAST MEDICAL & SURGICAL HISTORY:  Epilepsy      Seizure        FAMILY HISTORY:    Social Hx:  Nonsmoker, no drug or alcohol use  Medications and Allergies ReviewedMEDICATIONS  (STANDING):  cannabidiol Oral Solution 250 milliGRAM(s) Oral two times a day with meals  enoxaparin Injectable 40 milliGRAM(s) SubCutaneous every 12 hours  zonisamide 200 milliGRAM(s) Oral two times a day     ROS: Pertinent positives in HPI, all other ROS were reviewed and are negative.      Examination:   Vital Signs Last 24 Hrs  T(C): 36.6 (11 Feb 2024 08:59), Max: 36.9 (11 Feb 2024 02:42)  T(F): 97.8 (11 Feb 2024 08:59), Max: 98.5 (11 Feb 2024 02:42)  HR: 74 (11 Feb 2024 08:59) (74 - 90)  BP: 122/84 (11 Feb 2024 08:59) (122/84 - 145/93)  BP(mean): 104 (10 Feb 2024 15:15) (104 - 104)  RR: 18 (11 Feb 2024 08:59) (18 - 20)  SpO2: 97% (11 Feb 2024 08:59) (93% - 98%)    Parameters below as of 11 Feb 2024 08:59  Patient On (Oxygen Delivery Method): room air      General:  NAD   NECK: supple, no masses  ENT: Normal hearing   Vascular : no carotid bruits,   Lungs: CTAB  Chest: RRR, no murmurs  Extremities: nontender, no edema  Musculoskeletal: no adventitious movements, no joint stiffness  Skin: no rash    Neurological Examination:    MS: Alert, non verbal, follows some min commands  CN: PERLL, EOMI, V1-3 sensation intact, face symmetric, hearing intact, palate elevates symmetrically, tongue midline, SCM equal bilaterally  Motor: normal bulk and tone, no tremor, rigidity or bradykinesia.  4/5 all over   Sens: Intact to light touch.    Reflexes: 0-1/4 all over, downgoing toes b/l  Coord:  No gross dysmetria  Gait: Cannot test    Labs: Reviewed  Complete Blood Count (02.11.24 @ 07:34)   WBC Count: 7.63 K/uL  RBC Count: 5.50 M/uL  Hemoglobin: 12.9 g/dL  Hematocrit: 40.4 %  Mean Cell Volume: 73.5 fl  Mean Cell Hemoglobin: 23.5 pg  Mean Cell Hemoglobin Conc: 31.9 gm/dL  Red Cell Distrib Width: 14.6 %  Platelet Count - Automated: 231 K/uL    Comprehensive Metabolic Panel (02.11.24 @ 07:34)   Sodium: 139 mmol/L  Potassium: 3.1 mmol/L  Chloride: 108 mmol/L  Carbon Dioxide: 26 mmol/L  Anion Gap: 5 mmol/L  Blood Urea Nitrogen: 9 mg/dL  Creatinine: 0.92 mg/dL  Glucose: 107 mg/dL  Calcium: 9.3 mg/dL  Protein Total: 7.3 gm/dL  Albumin: 3.8 g/dL  Bilirubin Total: 0.6 mg/dL  Alkaline Phosphatase: 84 U/L  Aspartate Aminotransferase (AST/SGOT): 20 U/L  Alanine Aminotransferase (ALT/SGPT): 34 U/L  eGFR: 118:    < from: CT Head No Cont (02.10.24 @ 12:49) >  Head CT  2/10/2024    Thin axial sections through the head obtained from skull base to vertex.   Coronal and sagittal reconstructed images provided.    Comparison made to MRI brain 8/14/2017    The ventricles are within normal limits in size. The gray-white matter   differentiation is preserved. There is no focal edema, mass-effect or   midline shift. There is no intracranial hemorrhage. There are no abnormal   extra-axial collections.    There is no acute calvarial fracture. The visualized orbits are   unremarkable. The visualized paranasal sinuses reveals minimal ethmoid   sinus mucosal thickening. The tympanomastoid cavities are well-aerated.    Impression:    No evidence of acute intracranial hemorrhage, mass-effect or calvarial   fracture.    < end of copied text >    < from: MRI Head w/o Cont (08.14.17 @ 10:29) >    IMPRESSION:  No evidence for mesial temporal sclerosis.  No evidence for congenital abnormality or evidence for lesion.  Stable cerebral atrophy.      < end of copied text >

## 2024-02-11 NOTE — PATIENT PROFILE ADULT - FALL HARM RISK - HARM RISK INTERVENTIONS
Assistance OOB with selected safe patient handling equipment/Communicate Risk of Fall with Harm to all staff/Monitor for mental status changes/Move patient closer to nurses' station/Reinforce activity limits and safety measures with patient and family/Reorient to person, place and time as needed/Tailored Fall Risk Interventions/Toileting schedule using arm’s reach rule for commode and bathroom/Use of alarms - bed, chair and/or voice tab/Visual Cue: Yellow wristband and red socks/Bed in lowest position, wheels locked, appropriate side rails in place/Call bell, personal items and telephone in reach/Instruct patient to call for assistance before getting out of bed or chair/Non-slip footwear when patient is out of bed/[place] to call system/Physically safe environment - no spills, clutter or unnecessary equipment/Purposeful Proactive Rounding/Room/bathroom lighting operational, light cord in reach

## 2024-02-11 NOTE — PATIENT PROFILE ADULT - HOW PATIENT ADDRESSED, PROFILE
Anesthesia Type: 0.5% lidocaine with 1:200,000 epinephrine and a 1:10 solution of 8.4% sodium bicarbonate mell

## 2024-02-11 NOTE — CONSULT NOTE ADULT - ASSESSMENT
27 yo man  with PMHx of encephalitis,intellectual disability. Has seizures, usually in clusters of 3-4 every 3-4 months. Seizures are GTCS, with head and eyes tilting to the left side, with postictal and tongue biting. Has been on mx medications in the past, more recently zonisamide 300 mg at night.   Suggest:  change zonisamide to 200 mg BID  24 video EEG  seizure precautions.  Discussed with father at bedside.

## 2024-02-12 LAB
ALBUMIN SERPL ELPH-MCNC: 3.9 G/DL — SIGNIFICANT CHANGE UP (ref 3.3–5)
ALP SERPL-CCNC: 87 U/L — SIGNIFICANT CHANGE UP (ref 40–120)
ALT FLD-CCNC: 36 U/L — SIGNIFICANT CHANGE UP (ref 12–78)
ANION GAP SERPL CALC-SCNC: 3 MMOL/L — LOW (ref 5–17)
AST SERPL-CCNC: 17 U/L — SIGNIFICANT CHANGE UP (ref 15–37)
BILIRUB SERPL-MCNC: 0.5 MG/DL — SIGNIFICANT CHANGE UP (ref 0.2–1.2)
BUN SERPL-MCNC: 10 MG/DL — SIGNIFICANT CHANGE UP (ref 7–23)
CALCIUM SERPL-MCNC: 9.2 MG/DL — SIGNIFICANT CHANGE UP (ref 8.5–10.1)
CHLORIDE SERPL-SCNC: 110 MMOL/L — HIGH (ref 96–108)
CO2 SERPL-SCNC: 29 MMOL/L — SIGNIFICANT CHANGE UP (ref 22–31)
CREAT SERPL-MCNC: 1.15 MG/DL — SIGNIFICANT CHANGE UP (ref 0.5–1.3)
EGFR: 90 ML/MIN/1.73M2 — SIGNIFICANT CHANGE UP
GLUCOSE SERPL-MCNC: 99 MG/DL — SIGNIFICANT CHANGE UP (ref 70–99)
MAGNESIUM SERPL-MCNC: 2.1 MG/DL — SIGNIFICANT CHANGE UP (ref 1.6–2.6)
POTASSIUM SERPL-MCNC: 3.3 MMOL/L — LOW (ref 3.5–5.3)
POTASSIUM SERPL-SCNC: 3.3 MMOL/L — LOW (ref 3.5–5.3)
PROT SERPL-MCNC: 7.6 GM/DL — SIGNIFICANT CHANGE UP (ref 6–8.3)
SODIUM SERPL-SCNC: 142 MMOL/L — SIGNIFICANT CHANGE UP (ref 135–145)

## 2024-02-12 PROCEDURE — 99233 SBSQ HOSP IP/OBS HIGH 50: CPT

## 2024-02-12 PROCEDURE — 95720 EEG PHY/QHP EA INCR W/VEEG: CPT

## 2024-02-12 RX ORDER — POTASSIUM CHLORIDE 20 MEQ
40 PACKET (EA) ORAL EVERY 4 HOURS
Refills: 0 | Status: COMPLETED | OUTPATIENT
Start: 2024-02-12 | End: 2024-02-12

## 2024-02-12 RX ORDER — POTASSIUM CHLORIDE 20 MEQ
40 PACKET (EA) ORAL EVERY 4 HOURS
Refills: 0 | Status: DISCONTINUED | OUTPATIENT
Start: 2024-02-12 | End: 2024-02-12

## 2024-02-12 RX ADMIN — Medication 40 MILLIEQUIVALENT(S): at 14:56

## 2024-02-12 RX ADMIN — ENOXAPARIN SODIUM 40 MILLIGRAM(S): 100 INJECTION SUBCUTANEOUS at 23:12

## 2024-02-12 RX ADMIN — CANNABIDIOL 250 MILLIGRAM(S): 100 SOLUTION ORAL at 18:47

## 2024-02-12 RX ADMIN — Medication 200 MILLIGRAM(S): at 23:13

## 2024-02-12 RX ADMIN — CANNABIDIOL 250 MILLIGRAM(S): 100 SOLUTION ORAL at 09:42

## 2024-02-12 RX ADMIN — ENOXAPARIN SODIUM 40 MILLIGRAM(S): 100 INJECTION SUBCUTANEOUS at 09:43

## 2024-02-12 RX ADMIN — Medication 200 MILLIGRAM(S): at 09:42

## 2024-02-12 RX ADMIN — Medication 40 MILLIEQUIVALENT(S): at 09:43

## 2024-02-12 RX ADMIN — Medication 20 MILLIEQUIVALENT(S): at 06:07

## 2024-02-12 NOTE — PROGRESS NOTE ADULT - ASSESSMENT
27 yo man  with PMHx of encephalitis,intellectual disability. Has seizures, usually in clusters of 3-4 every 3-4 months. Seizures are GTCS, with head and eyes tilting to the left side, with postictal and tongue biting. Has been on mx medications in the past, more recently zonisamide 300 mg at night. None since admitted. CT head showed no acute changes, EEG c/w left hemisphere origin.  Suggest:  continue  zonisamide to 200 mg BID  Can f/u with own [facility] neurologist, or f/u with Dr. Lott in our office  Discussed with mother at bedside.
28 year-old man with hx of global developmental delay, encephalitis, seizure disorder, has seizures usually in clusters of 3-4 every 3-4 months, generalized tonic-clonic with head and eyes tilting to the left side, tongue biting, and postictal state, has been on multiple medications in the past but most recently zonisamide 300 mg at night, presented for further management of multiple seizures. In the ED, patient had non focal neuro exam. WBC 10.7. Hgb, Plts WNL. BMP WNL. LFTs WNL. UA turbid character but negative for WBC, negative for bacteria.  CT head with no evidence of acute intracranial hemorrhage, mass-effect or calvarial fracture. ED administered Ativan and admitted patient to Medicine.     Seizure disorder  Appreciate input from Neurology. CT head showed no acute changes, EEG c/w left hemisphere origin. Continued zonisamide at increased dose, now 200 mg BID. No further seizure events. Neurology advises to continue this dosage and have patient either f/u with own [facility] neurologist or f/u with Dr. Lott in [facility] office.    Hypokalemia  K 3.3. Ordered for potassium supplementation to correct. Trend K, goal ~4      Dispo: Anticipate DC home 2/13 if stable  
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

## 2024-02-12 NOTE — PROGRESS NOTE ADULT - SUBJECTIVE AND OBJECTIVE BOX
HPI:  28 year old man PMH of epilepsy, s/p encephalitis on Cannabidiol, global developmental delay presents to the ED for flurry of seizures. CT-HEAD: No evidence of acute intracranial hemorrhage, mass-effect or calvarial fracture. None since admitted.     Allergies: Carbamezapine, Dilantin, Keppra, Phenobarbital. As per Father, those medications were initiated while patient was in a come (for 3 months), developed rash.   PCP: Dr. Tripathi.   (10 Feb 2024 21:57)         MEDICATIONS  (STANDING):  cannabidiol Oral Solution 250 milliGRAM(s) Oral two times a day with meals  enoxaparin Injectable 40 milliGRAM(s) SubCutaneous every 12 hours  potassium chloride   Powder 40 milliEquivalent(s) Oral every 4 hours  zonisamide 200 milliGRAM(s) Oral two times a day    MEDICATIONS  (PRN):  acetaminophen     Tablet .. 650 milliGRAM(s) Oral every 6 hours PRN Mild Pain (1 - 3)  aluminum hydroxide/magnesium hydroxide/simethicone Suspension 30 milliLiter(s) Oral every 4 hours PRN Dyspepsia  LORazepam   Injectable 2 milliGRAM(s) IV Push every 2 hours PRN Seizure Activity  melatonin 3 milliGRAM(s) Oral at bedtime PRN Insomnia  ondansetron Injectable 4 milliGRAM(s) IV Push every 6 hours PRN Nausea and/or Vomiting      ICU Vital Signs Last 24 Hrs  T(C): 36.3 (12 Feb 2024 08:13), Max: 36.8 (11 Feb 2024 20:45)  T(F): 97.3 (12 Feb 2024 08:13), Max: 98.3 (11 Feb 2024 20:45)  HR: 91 (12 Feb 2024 08:13) (90 - 92)  BP: 131/78 (12 Feb 2024 08:13) (131/78 - 145/89)  RR: 18 (12 Feb 2024 08:13) (18 - 19)  SpO2: 100% (12 Feb 2024 08:13) (95% - 100%)    O2 Parameters below as of 12 Feb 2024 08:13  Patient On (Oxygen Delivery Method): room air          Neurological Exam:    HF: Patient is alert, non verbal, follows some commands.   CN:  Pupils are equal and reactive. Extra ocular muscles are intact. There is no facial droop or asymmetry. Tongue is midline. Sensation is intact in the face. Other CN II-XII are intact.   Motor: motor examination all muscles are 4/5.   Sensory: withdraws to pinprick  DTR: 2/4 all 4 extremities. Babinski is negative bilateral.  Co-ord:  Limited, no gross dysmetria    Comprehensive Metabolic Panel in AM (02.12.24 @ 06:31)   Sodium: 142 mmol/L  Potassium: 3.3 mmol/L  Chloride: 110 mmol/L  Carbon Dioxide: 29 mmol/L  Anion Gap: 3 mmol/L  Blood Urea Nitrogen: 10 mg/dL  Creatinine: 1.15 mg/dL  Glucose: 99 mg/dL  Calcium: 9.2 mg/dL  Protein Total: 7.6 gm/dL  Albumin: 3.9 g/dL  Bilirubin Total: 0.5 mg/dL  Alkaline Phosphatase: 87 U/L  Aspartate Aminotransferase (AST/SGOT): 17 U/L  Alanine Aminotransferase (ALT/SGPT): 36 U/L  eGFR: 90:     < from: CT Head No Cont (02.10.24 @ 12:49) >  Comparison made to MRI brain 8/14/2017    The ventricles are within normal limits in size. The gray-white matter   differentiation is preserved. There is no focal edema, mass-effect or   midline shift. There is no intracranial hemorrhage. There are no abnormal   extra-axial collections.    There is no acute calvarial fracture. The visualized orbits are   unremarkable. The visualized paranasal sinuses reveals minimal ethmoid   sinus mucosalthickening. The tympanomastoid cavities are well-aerated.    Impression:    No evidence of acute intracranial hemorrhage, mass-effect or calvarial   fracture.    < end of copied text >    < from: MRI Head w/o Cont (08.14.17 @ 10:29) >      IMPRESSION:  No evidence for mesial temporal sclerosis.  No evidence for congenital abnormality or evidence for lesion.  Stable cerebral atrophy.      < end of copied text >      < from: EEG w/ Video Each 12-26 Hours, Unmonitored (02.12.24 @ 11:49) >    IMPRESSION: Abnormal 24 hour video EEG recording for the patient awake,   drowsy and asleep because of theta range activity, noted over the left   hemisphere consistent with an underlying structural abnormality, mixed   with recurrent epileptiform discharges which are potentially   epileptogenic.  Clinical correlation recommended.    --- End of Report ---    < end of copied text >  
Chief Complaint: Seizure    Interval Hx: Patient seen and examined this AM. History from patient limited due to his global developmental delay. No further seizures. Patient's mother at bedside, reports that he is doing well, best he has been in a while, eating well, awake and alert.     ROS: Multi system review is limited by patient's baseline neurocognitive impairment     Vitals:  T(F): 97.3 (12 Feb 2024 08:13), Max: 98.3 (11 Feb 2024 20:45)  HR: 91 (12 Feb 2024 08:13) (90 - 91)  BP: 131/78 (12 Feb 2024 08:13) (131/78 - 145/89)  RR: 18 (12 Feb 2024 08:13) (18 - 19)  SpO2: 100% (12 Feb 2024 08:13) (98% - 100%) on room air    Exam:  Gen: Comfortable  HEENT: NCAT PERRL EOMI MMM clear oropharynx  Neck: Supple, no JVD, no LAD  CVS: s1 s2 normal, regular, rate ~90  Chest: Normal resp effort at rest, lungs CTA B/L  Abd: +BS, soft NT ND  Ext: No edema or calf tenderness  Skin: Warm, dry  Mood: Calm  Neuro: Alert, non verbal, follows some commands. PERRL. EOMI. No facial droop or asymmetry. Tongue midline. Sensation intact in the face. Other CN II-XII are intact. Motor strength 4/5 throughout. Withdraws to pinprick in all extremities. DTR 2/4 all 4 extremities. Babinski negative bilateral. Coordination exam limited but no gross dysmetria. Gait not tested.     Labs:                        12.9   7.63  )----------( 231                   40.4       142  |  110  |  10  -----------------------<  99  3.3   |   29   |  1.15    Ca  9.2   Mg  2.1      TPro  7.6  /  Alb  3.9  /  TBili  0.5  /  DBili  x   /  AST  17  /  ALT  36  /  AlkPhos  87      A1c 5.7  LDL 75    UA 2/10: Yellow, turbid, trace ket, N neg, LE neg, 1 wbc, 1 rbc, no bacteria    Imaging:   CT head WO 2/10: The ventricles are within normal limits in size. The gray-white matter differentiation is preserved. There is no focal edema, mass-effect or midline shift. There is no intracranial hemorrhage. There are no abnormal extra-axial collections. There is no acute calvarial fracture. The visualized orbits are unremarkable. The visualized paranasal sinuses reveals minimal ethmoid sinus mucosal thickening. The tympanomastoid cavities are well-aerated.    Meds:  MEDICATIONS  (STANDING):  cannabidiol Oral Solution 250 milliGRAM(s) Oral two times a day with meals  enoxaparin Injectable 40 milliGRAM(s) SubCutaneous every 12 hours  zonisamide 200 milliGRAM(s) Oral two times a day    MEDICATIONS  (PRN):  acetaminophen     Tablet .. 650 milliGRAM(s) Oral every 6 hours PRN Mild Pain (1 - 3)  aluminum hydroxide/magnesium hydroxide/simethicone Suspension 30 milliLiter(s) Oral every 4 hours PRN Dyspepsia  LORazepam   Injectable 2 milliGRAM(s) IV Push every 2 hours PRN Seizure Activity  melatonin 3 milliGRAM(s) Oral at bedtime PRN Insomnia  ondansetron Injectable 4 milliGRAM(s) IV Push every 6 hours PRN Nausea and/or Vomiting                          
Patient seen and examined with father at bedside. Father states that patient has not been verbal since age 9 at which age he started having seizures. Patient having EEG leads placed on his scalp at the time of the examination and interview with father.     Vital Signs Last 24 Hrs  T(C): 36.6 (11 Feb 2024 08:59), Max: 36.9 (11 Feb 2024 02:42)  T(F): 97.8 (11 Feb 2024 08:59), Max: 98.5 (11 Feb 2024 02:42)  HR: 92 (11 Feb 2024 15:43) (74 - 92)  BP: 135/78 (11 Feb 2024 15:43) (122/84 - 145/93)  BP(mean): --  RR: 18 (11 Feb 2024 15:43) (18 - 20)  SpO2: 95% (11 Feb 2024 15:43) (93% - 98%)    Parameters below as of 11 Feb 2024 15:43  Patient On (Oxygen Delivery Method): room air    UNABLE TO OBTAIN ROS                          12.9   7.63  )-----------( 231      ( 11 Feb 2024 07:34 )             40.4   02-11    139  |  108  |  9   ----------------------------<  107<H>  3.1<L>   |  26  |  0.92    Ca    9.3      11 Feb 2024 07:34    TPro  7.3  /  Alb  3.8  /  TBili  0.6  /  DBili  x   /  AST  20  /  ALT  34  /  AlkPhos  84  02-11

## 2024-02-13 ENCOUNTER — TRANSCRIPTION ENCOUNTER (OUTPATIENT)
Age: 29
End: 2024-02-13

## 2024-02-13 VITALS
HEART RATE: 116 BPM | RESPIRATION RATE: 18 BRPM | SYSTOLIC BLOOD PRESSURE: 133 MMHG | DIASTOLIC BLOOD PRESSURE: 85 MMHG | OXYGEN SATURATION: 94 % | TEMPERATURE: 98 F

## 2024-02-13 LAB
ANION GAP SERPL CALC-SCNC: 3 MMOL/L — LOW (ref 5–17)
BUN SERPL-MCNC: 9 MG/DL — SIGNIFICANT CHANGE UP (ref 7–23)
CALCIUM SERPL-MCNC: 9.3 MG/DL — SIGNIFICANT CHANGE UP (ref 8.5–10.1)
CHLORIDE SERPL-SCNC: 111 MMOL/L — HIGH (ref 96–108)
CO2 SERPL-SCNC: 26 MMOL/L — SIGNIFICANT CHANGE UP (ref 22–31)
CREAT SERPL-MCNC: 1.06 MG/DL — SIGNIFICANT CHANGE UP (ref 0.5–1.3)
EGFR: 99 ML/MIN/1.73M2 — SIGNIFICANT CHANGE UP
GLUCOSE SERPL-MCNC: 97 MG/DL — SIGNIFICANT CHANGE UP (ref 70–99)
POTASSIUM SERPL-MCNC: 3.8 MMOL/L — SIGNIFICANT CHANGE UP (ref 3.5–5.3)
POTASSIUM SERPL-SCNC: 3.8 MMOL/L — SIGNIFICANT CHANGE UP (ref 3.5–5.3)
SODIUM SERPL-SCNC: 140 MMOL/L — SIGNIFICANT CHANGE UP (ref 135–145)

## 2024-02-13 PROCEDURE — 99239 HOSP IP/OBS DSCHRG MGMT >30: CPT

## 2024-02-13 RX ORDER — ZONISAMIDE 100 MG
3 CAPSULE ORAL
Refills: 0 | DISCHARGE

## 2024-02-13 RX ORDER — ZONISAMIDE 100 MG
2 CAPSULE ORAL
Qty: 240 | Refills: 0
Start: 2024-02-13

## 2024-02-13 RX ADMIN — CANNABIDIOL 250 MILLIGRAM(S): 100 SOLUTION ORAL at 17:12

## 2024-02-13 RX ADMIN — ENOXAPARIN SODIUM 40 MILLIGRAM(S): 100 INJECTION SUBCUTANEOUS at 10:32

## 2024-02-13 RX ADMIN — Medication 200 MILLIGRAM(S): at 10:32

## 2024-02-13 RX ADMIN — CANNABIDIOL 250 MILLIGRAM(S): 100 SOLUTION ORAL at 10:32

## 2024-02-13 NOTE — DISCHARGE NOTE NURSING/CASE MANAGEMENT/SOCIAL WORK - NSDCFUADDAPPT_GEN_ALL_CORE_FT
APPOINTMENT  2/27/2024 10:20 A.M. with Dr June  [facility]  [address] [place] [address]  [phone]

## 2024-02-13 NOTE — DISCHARGE NOTE PROVIDER - HOSPITAL COURSE
28 year-old man with hx of global developmental delay, encephalitis, seizure disorder, has seizures usually in clusters of 3-4 every 3-4 months, generalized tonic-clonic with head and eyes tilting to the left side, tongue biting, and postictal state, has been on multiple medications in the past but most recently zonisamide 300 mg at night, presented for further management of multiple seizures. In the ED, patient had non focal neuro exam. WBC 10.7. Hgb, Plts WNL. BMP WNL. LFTs WNL. UA turbid character but negative for WBC, negative for bacteria.  CT head with no evidence of acute intracranial hemorrhage, mass-effect or calvarial fracture. ED administered Ativan and admitted patient to Medicine.     Seizure disorder  Appreciate input from Neurology. CT head showed no acute changes, EEG c/w left hemisphere origin. Continued zonisamide at increased dose, now 200 mg BID. No further seizure events. Neurology advises to continue this dosage and have patient follow up with Neurology as outpatient. Mother expressed wishes to follow up with rGacy. Referred to Dr Lott who confirmed she can receive Medicaid patient.     Hypokalemia  K 3.3 upon admission, treated with potassium supplement, now K WNL.

## 2024-02-13 NOTE — DISCHARGE NOTE PROVIDER - NSDCPNSUBOBJ_GEN_ALL_CORE
Chief Complaint: Seizure    Interval Hx: Patient seen and examined this AM. History from patient limited due to his global developmental delay. No further seizures. Patient's mother at bedside, reports that he is doing well, best he has been in a while, eating well, awake and alert. Stable for discharge home as per Neurology.     ROS: Multi system review is limited by patient's baseline neurocognitive impairment     Discharge Exam:  Afebrile  BP 130s/80s  HR 90s  RR 16  O2 98% on room air  Gen: Comfortable  HEENT: NCAT PERRL EOMI MMM clear oropharynx  Neck: Supple, no JVD, no LAD  CVS: s1 s2 normal, regular, rate ~90  Chest: Normal resp effort at rest, lungs CTA B/L  Abd: +BS, soft NT ND  Ext: No edema or calf tenderness  Skin: Warm, dry  Mood: Calm  Neuro: Alert, non verbal, follows some commands. PERRL. EOMI. No facial droop or asymmetry. Tongue midline. Sensation intact in the face. Other CN II-XII are intact. Motor strength 4/5 throughout. Withdraws to pinprick in all extremities. DTR 2/4 all 4 extremities. Babinski negative bilateral. Coordination exam limited but no gross dysmetria. Gait not tested.     Labs:                        12.9   7.63  )----------( 231                   40.4       140  |  111  |   9  -----------------------<  97  3.8   |   26   |  1.06    Ca  9.3   Mg  2.1      TPro  7.6  /  Alb  3.9  /  TBili  0.5  /  DBili  x   /  AST  17  /  ALT  36  /  AlkPhos  87      A1c 5.7  LDL 75    UA 2/10: Yellow, turbid, trace ket, N neg, LE neg, 1 wbc, 1 rbc, no bacteria    Imaging:   CT head WO 2/10: The ventricles are within normal limits in size. The gray-white matter differentiation is preserved. There is no focal edema, mass-effect or midline shift. There is no intracranial hemorrhage. There are no abnormal extra-axial collections. There is no acute calvarial fracture. The visualized orbits are unremarkable. The visualized paranasal sinuses reveals minimal ethmoid sinus mucosal thickening. The tympanomastoid cavities are well-aerated.    Meds:  MEDICATIONS  (STANDING):  cannabidiol Oral Solution 250 milliGRAM(s) Oral two times a day with meals  enoxaparin Injectable 40 milliGRAM(s) SubCutaneous every 12 hours  zonisamide 200 milliGRAM(s) Oral two times a day    MEDICATIONS  (PRN):  acetaminophen     Tablet .. 650 milliGRAM(s) Oral every 6 hours PRN Mild Pain (1 - 3)  aluminum hydroxide/magnesium hydroxide/simethicone Suspension 30 milliLiter(s) Oral every 4 hours PRN Dyspepsia  LORazepam   Injectable 2 milliGRAM(s) IV Push every 2 hours PRN Seizure Activity  melatonin 3 milliGRAM(s) Oral at bedtime PRN Insomnia  ondansetron Injectable 4 milliGRAM(s) IV Push every 6 hours PRN Nausea and/or Vomiting

## 2024-02-13 NOTE — DISCHARGE NOTE PROVIDER - NSDCMRMEDTOKEN_GEN_ALL_CORE_FT
Epidiolex 100 mg/mL oral liquid: 2.5 mg/kg orally 2 times a day  zonisamide 100 mg oral capsule: 2 cap(s) orally 2 times a day

## 2024-02-13 NOTE — DISCHARGE NOTE NURSING/CASE MANAGEMENT/SOCIAL WORK - PATIENT PORTAL LINK FT
You can access the FollowMyHealth Patient Portal offered by [facility] by registering at the following website: [email or web address]. By joining [facility]’s FollowMyHealth portal, you will also be able to view your health information using other applications (apps) compatible with our system.

## 2024-02-13 NOTE — DISCHARGE NOTE NURSING/CASE MANAGEMENT/SOCIAL WORK - NSDCPEFALRISK_GEN_ALL_CORE
For information on Fall & Injury Prevention, visit: [email or web address] OR  [email or web address] OR  https://www.cdc.gov/steadi/patient.html

## 2024-02-13 NOTE — DISCHARGE NOTE PROVIDER - NSDCCPCAREPLAN_GEN_ALL_CORE_FT
PRINCIPAL DISCHARGE DIAGNOSIS  Diagnosis: Seizures  Assessment and Plan of Treatment: You were seen and evaluated in the hospital for seizures despite zonisamide 300mg at home. In the ED, your exam was without focal neurological deficits. With exception of mildly low potassium level, labs were normal including CBC, kidney function, other electolytes, liver function tests. Urinalysis was negative for infection. No sign of pneumonia, virus or other infection. Cat scan of the head found no evidence of acute intracranial hemorrhage, mass, mass-effect, acute territorial infarction or calvarial fracture. You were give a dose of Ativan and admitted to Medicine. ED administered Ativan and admitted patient to Medicine. You were assessed and treated by Neurology Dr. Nix. EEG was performed and was consistent with left hemisphere origin seizure. Your zonisamide was increased in dose, now 200 mg twice per day. No further seizure events. Doing well. Stable for discharge as per Neurology, to continue this dosage and follow up with Neurology as outpatient. Your mother expressed wishes to follow up with Gracy. Referred to Dr Lott who confirmed she can receive Medicaid patient such as yourself.

## 2024-02-13 NOTE — DISCHARGE NOTE PROVIDER - CARE PROVIDER_API CALL
Adelia Lott.  Neurology  [address] [facility], [place] [address]  Phone: [phone]  Fax: [phone]  Follow Up Time: 2 weeks

## 2024-02-15 LAB — ZONISAMIDE SERPL-MCNC: 23.2 UG/ML — SIGNIFICANT CHANGE UP (ref 10–40)

## 2024-02-20 DIAGNOSIS — Z88.8 ALLERGY STATUS TO OTHER DRUGS, MEDICAMENTS AND BIOLOGICAL SUBSTANCES: ICD-10-CM

## 2024-02-20 DIAGNOSIS — G40.419 OTHER GENERALIZED EPILEPSY AND EPILEPTIC SYNDROMES, INTRACTABLE, WITHOUT STATUS EPILEPTICUS: ICD-10-CM

## 2024-02-20 DIAGNOSIS — F88 OTHER DISORDERS OF PSYCHOLOGICAL DEVELOPMENT: ICD-10-CM

## 2024-02-20 DIAGNOSIS — E87.6 HYPOKALEMIA: ICD-10-CM

## 2024-02-20 DIAGNOSIS — G40.909 EPILEPSY, UNSPECIFIED, NOT INTRACTABLE, WITHOUT STATUS EPILEPTICUS: ICD-10-CM

## 2024-03-13 NOTE — ED ADULT NURSE REASSESSMENT NOTE - NS ED NURSE REASSESS COMMENT FT1
Epidiolex medication was brought to pharmacy for verification. Pharmacy stated they will walk medication back to ER when they are done verifying medication.
Patient had a seizure lasting approximately 2 minutes. Patient pulled out intravenous line. Ativan was administered by intramuscular. Dr. William notified.
Patients father wants to speak with doctor. Hospitalist doctor (Dr. Roth was notified) and stated he would come to see patient around 1900. Patients father was notified.
Quality 226: Preventive Care And Screening: Tobacco Use: Screening And Cessation Intervention: Patient screened for tobacco use and is an ex/non-smoker
Detail Level: Detailed

## 2024-05-09 ENCOUNTER — APPOINTMENT (OUTPATIENT)
Dept: NEUROLOGY | Facility: HOSPITAL | Age: 29
End: 2024-05-09

## 2025-01-23 ENCOUNTER — EMERGENCY (EMERGENCY)
Facility: HOSPITAL | Age: 30
LOS: 0 days | Discharge: HOME | End: 2025-01-23
Attending: EMERGENCY MEDICINE
Payer: MEDICAID

## 2025-01-23 VITALS
SYSTOLIC BLOOD PRESSURE: 100 MMHG | HEART RATE: 74 BPM | OXYGEN SATURATION: 99 % | DIASTOLIC BLOOD PRESSURE: 58 MMHG | TEMPERATURE: 99 F | RESPIRATION RATE: 17 BRPM

## 2025-01-23 VITALS — HEIGHT: 66 IN | WEIGHT: 195.11 LBS

## 2025-01-23 LAB
ALBUMIN SERPL ELPH-MCNC: 4.2 G/DL — SIGNIFICANT CHANGE UP (ref 3.3–5)
ALP SERPL-CCNC: 91 U/L — SIGNIFICANT CHANGE UP (ref 40–120)
ALT FLD-CCNC: 32 U/L — SIGNIFICANT CHANGE UP (ref 12–78)
ANION GAP SERPL CALC-SCNC: 5 MMOL/L — SIGNIFICANT CHANGE UP (ref 5–17)
ANISOCYTOSIS BLD QL: SLIGHT — SIGNIFICANT CHANGE UP
AST SERPL-CCNC: 21 U/L — SIGNIFICANT CHANGE UP (ref 15–37)
BASOPHILS # BLD AUTO: 0.03 K/UL — SIGNIFICANT CHANGE UP (ref 0–0.2)
BASOPHILS NFR BLD AUTO: 0.4 % — SIGNIFICANT CHANGE UP (ref 0–2)
BILIRUB SERPL-MCNC: 0.3 MG/DL — SIGNIFICANT CHANGE UP (ref 0.2–1.2)
BUN SERPL-MCNC: 14 MG/DL — SIGNIFICANT CHANGE UP (ref 7–23)
CALCIUM SERPL-MCNC: 10.1 MG/DL — SIGNIFICANT CHANGE UP (ref 8.5–10.1)
CHLORIDE SERPL-SCNC: 111 MMOL/L — HIGH (ref 96–108)
CO2 SERPL-SCNC: 24 MMOL/L — SIGNIFICANT CHANGE UP (ref 22–31)
CREAT SERPL-MCNC: 1.21 MG/DL — SIGNIFICANT CHANGE UP (ref 0.5–1.3)
EGFR: 84 ML/MIN/1.73M2 — SIGNIFICANT CHANGE UP
EOSINOPHIL # BLD AUTO: 0.06 K/UL — SIGNIFICANT CHANGE UP (ref 0–0.5)
EOSINOPHIL NFR BLD AUTO: 0.8 % — SIGNIFICANT CHANGE UP (ref 0–6)
FLUAV AG NPH QL: SIGNIFICANT CHANGE UP
FLUBV AG NPH QL: SIGNIFICANT CHANGE UP
GLUCOSE BLDC GLUCOMTR-MCNC: 88 MG/DL — SIGNIFICANT CHANGE UP (ref 70–99)
GLUCOSE SERPL-MCNC: 100 MG/DL — HIGH (ref 70–99)
HCT VFR BLD CALC: 46.1 % — SIGNIFICANT CHANGE UP (ref 39–50)
HGB BLD-MCNC: 14.1 G/DL — SIGNIFICANT CHANGE UP (ref 13–17)
IMM GRANULOCYTES NFR BLD AUTO: 0.6 % — SIGNIFICANT CHANGE UP (ref 0–0.9)
LYMPHOCYTES # BLD AUTO: 1.48 K/UL — SIGNIFICANT CHANGE UP (ref 1–3.3)
LYMPHOCYTES # BLD AUTO: 18.7 % — SIGNIFICANT CHANGE UP (ref 13–44)
MANUAL SMEAR VERIFICATION: SIGNIFICANT CHANGE UP
MCHC RBC-ENTMCNC: 22.4 PG — LOW (ref 27–34)
MCHC RBC-ENTMCNC: 30.6 G/DL — LOW (ref 32–36)
MCV RBC AUTO: 73.3 FL — LOW (ref 80–100)
MICROCYTES BLD QL: SLIGHT — SIGNIFICANT CHANGE UP
MONOCYTES # BLD AUTO: 0.57 K/UL — SIGNIFICANT CHANGE UP (ref 0–0.9)
MONOCYTES NFR BLD AUTO: 7.2 % — SIGNIFICANT CHANGE UP (ref 2–14)
NEUTROPHILS # BLD AUTO: 5.74 K/UL — SIGNIFICANT CHANGE UP (ref 1.8–7.4)
NEUTROPHILS NFR BLD AUTO: 72.3 % — SIGNIFICANT CHANGE UP (ref 43–77)
PLAT MORPH BLD: NORMAL — SIGNIFICANT CHANGE UP
PLATELET # BLD AUTO: 347 K/UL — SIGNIFICANT CHANGE UP (ref 150–400)
POTASSIUM SERPL-MCNC: 4 MMOL/L — SIGNIFICANT CHANGE UP (ref 3.5–5.3)
POTASSIUM SERPL-SCNC: 4 MMOL/L — SIGNIFICANT CHANGE UP (ref 3.5–5.3)
PROT SERPL-MCNC: 8.3 GM/DL — SIGNIFICANT CHANGE UP (ref 6–8.3)
RBC # BLD: 6.29 M/UL — HIGH (ref 4.2–5.8)
RBC # FLD: 14.6 % — HIGH (ref 10.3–14.5)
RBC BLD AUTO: ABNORMAL
RSV RNA NPH QL NAA+NON-PROBE: SIGNIFICANT CHANGE UP
SARS-COV-2 RNA SPEC QL NAA+PROBE: SIGNIFICANT CHANGE UP
SODIUM SERPL-SCNC: 140 MMOL/L — SIGNIFICANT CHANGE UP (ref 135–145)
WBC # BLD: 7.93 K/UL — SIGNIFICANT CHANGE UP (ref 3.8–10.5)
WBC # FLD AUTO: 7.93 K/UL — SIGNIFICANT CHANGE UP (ref 3.8–10.5)

## 2025-01-23 PROCEDURE — 71045 X-RAY EXAM CHEST 1 VIEW: CPT

## 2025-01-23 PROCEDURE — 71045 X-RAY EXAM CHEST 1 VIEW: CPT | Mod: 26

## 2025-01-23 PROCEDURE — 99284 EMERGENCY DEPT VISIT MOD MDM: CPT | Mod: 25

## 2025-01-23 PROCEDURE — 36415 COLL VENOUS BLD VENIPUNCTURE: CPT

## 2025-01-23 PROCEDURE — 82962 GLUCOSE BLOOD TEST: CPT

## 2025-01-23 PROCEDURE — 96374 THER/PROPH/DIAG INJ IV PUSH: CPT

## 2025-01-23 PROCEDURE — 0241U: CPT

## 2025-01-23 PROCEDURE — 80053 COMPREHEN METABOLIC PANEL: CPT

## 2025-01-23 PROCEDURE — C9254: CPT

## 2025-01-23 PROCEDURE — 99285 EMERGENCY DEPT VISIT HI MDM: CPT

## 2025-01-23 PROCEDURE — 85025 COMPLETE CBC W/AUTO DIFF WBC: CPT

## 2025-01-23 PROCEDURE — 96375 TX/PRO/DX INJ NEW DRUG ADDON: CPT

## 2025-01-23 RX ORDER — LORAZEPAM 1 MG/1
2 TABLET ORAL ONCE
Refills: 0 | Status: DISCONTINUED | OUTPATIENT
Start: 2025-01-23 | End: 2025-01-23

## 2025-01-23 RX ORDER — LACOSAMIDE 100 MG/1
150 TABLET, FILM COATED ORAL ONCE
Refills: 0 | Status: DISCONTINUED | OUTPATIENT
Start: 2025-01-23 | End: 2025-01-23

## 2025-01-23 RX ORDER — CEFTRIAXONE SODIUM 1 G/1
1000 INJECTION, POWDER, FOR SOLUTION INTRAMUSCULAR; INTRAVENOUS ONCE
Refills: 0 | Status: COMPLETED | OUTPATIENT
Start: 2025-01-23 | End: 2025-01-23

## 2025-01-23 RX ORDER — SODIUM CHLORIDE 9 MG/ML
1000 INJECTION, SOLUTION INTRAMUSCULAR; INTRAVENOUS; SUBCUTANEOUS ONCE
Refills: 0 | Status: COMPLETED | OUTPATIENT
Start: 2025-01-23 | End: 2025-01-23

## 2025-01-23 RX ORDER — KETOROLAC TROMETHAMINE 30 MG/ML
15 INJECTION INTRAMUSCULAR; INTRAVENOUS ONCE
Refills: 0 | Status: DISCONTINUED | OUTPATIENT
Start: 2025-01-23 | End: 2025-01-23

## 2025-01-23 RX ORDER — CEFTRIAXONE SODIUM 1 G/1
1000 INJECTION, POWDER, FOR SOLUTION INTRAMUSCULAR; INTRAVENOUS ONCE
Refills: 0 | Status: DISCONTINUED | OUTPATIENT
Start: 2025-01-23 | End: 2025-01-23

## 2025-01-23 RX ORDER — ACETAMINOPHEN 80 MG/.8ML
1000 SOLUTION/ DROPS ORAL ONCE
Refills: 0 | Status: COMPLETED | OUTPATIENT
Start: 2025-01-23 | End: 2025-01-23

## 2025-01-23 RX ADMIN — SODIUM CHLORIDE 1000 MILLILITER(S): 9 INJECTION, SOLUTION INTRAMUSCULAR; INTRAVENOUS; SUBCUTANEOUS at 14:02

## 2025-01-23 RX ADMIN — KETOROLAC TROMETHAMINE 15 MILLIGRAM(S): 30 INJECTION INTRAMUSCULAR; INTRAVENOUS at 15:42

## 2025-01-23 RX ADMIN — LORAZEPAM 2 MILLIGRAM(S): 1 TABLET ORAL at 18:09

## 2025-01-23 RX ADMIN — LACOSAMIDE 100 MILLIGRAM(S): 100 TABLET, FILM COATED ORAL at 18:46

## 2025-01-23 RX ADMIN — ACETAMINOPHEN 400 MILLIGRAM(S): 80 SOLUTION/ DROPS ORAL at 15:46

## 2025-01-23 RX ADMIN — CEFTRIAXONE SODIUM 1000 MILLIGRAM(S): 1 INJECTION, POWDER, FOR SOLUTION INTRAMUSCULAR; INTRAVENOUS at 15:42

## 2025-01-23 NOTE — ED PROVIDER NOTE - NSFOLLOWUPINSTRUCTIONS_ED_ALL_ED_FT
Continue usual medicines and care.     Call [facility] for follow-up. Call in the AM tomorrow. 1/24/25.  It opens at 8am.    [phone]  [address] [place] [id number]    Return to the Emerg Dept for worsening, any concerns.

## 2025-01-23 NOTE — ED PROVIDER NOTE - PHYSICAL EXAMINATION
Constitutional: NAD  Eyes: PERRL EOMI  Head: Normocephalic atraumatic  Mouth: MMM  Cardiac: regular rate and rhythm  Resp: Lungs CTAB  GI: Abd s/nd/nt  Neuro: CN2-12 grossly intact, RILEY x 4  Skin: No visible rashes

## 2025-01-23 NOTE — ED ADULT NURSE REASSESSMENT NOTE - NS ED NURSE REASSESS COMMENT FT1
As Per MD Andujar, pt does not require IV Access. We can reassess need after blood work results. Family at bedside aware.

## 2025-01-23 NOTE — ED ADULT NURSE NOTE - OBJECTIVE STATEMENT
[place] to ED at [place] baseline as per Mom at bedside c/o decrease po intake and pt not taking home medications x 2 days due to mouth sores and irritated gums. Mother requesting to have IV medications and is concerned bc pt has not been able to take seizure medications. Last seizure this am, last approx 1 minute, breaking on it's own. Pt placed within view of RN station. Non-verbal at baseline. No acute distress noted.

## 2025-01-23 NOTE — ED ADULT TRIAGE NOTE - CHIEF COMPLAINT QUOTE
patient presents with mother and brother for decreased PO intake x 2 days.  brother reports patients gums look red and he hasn't been eating much the last couple days.  hx intellectual disability, nonverbal at baseline

## 2025-01-23 NOTE — ED PROVIDER NOTE - CLINICAL SUMMARY MEDICAL DECISION MAKING FREE TEXT BOX
29-year-old nonverbal male with history of seizures with not eating or drinking for 24 hours.  Likely secondary to tooth pain.  Will check labs, chest x-ray, viral swab.  Will give IV fluids, IV pain medicine and IV antibiotic for possible tooth infection.  Will also give pt his usual antiseizure medicine.   Observation and reevaluation.

## 2025-01-23 NOTE — ED ADULT NURSE NOTE - PAIN: PRESENCE, MLM
Continue Regimen: Daily antihistamine QHS, Clobetasol bid prn (patient has allergy to propylene glycol) Render In Strict Bullet Format?: No Detail Level: Zone non-verbal indicators absent (Rating = 0)

## 2025-01-23 NOTE — ED ADULT NURSE NOTE - NSFALLRISKASMT_ED_ALL_ED_DT
[de-identified] : Since the patient is improving since his cortisone injection and is now walking without assistive devices patient was advised ice elevation Tylenol up to 3000 mg a day since he is on Eliquis and cannot use nonsteroidal medications patient was advised to use his knee brace when he is walking but to put it down if he is sitting or driving.  Patient was advised isometric and straight leg raising exercises he should make an appointment in approximately a month's time and if he is having discomfort we will give him a gel 1 injection in the office at that time.  Patient was also advised to try and obtain a copy of the report of the MRI of the knee that was done in [place]
23-Jan-2025 15:24

## 2025-01-23 NOTE — ED PROVIDER NOTE - PATIENT PORTAL LINK FT
You can access the FollowMyHealth Patient Portal offered by [facility] by registering at the following website: [email or web address]. By joining [facility]’s FollowMyHealth portal, you will also be able to view your health information using other applications (apps) compatible with our system.

## 2025-01-23 NOTE — ED ADULT NURSE REASSESSMENT NOTE - NS ED NURSE REASSESS COMMENT FT1
Pt given home Epidioley 2.3mL PO by mother approximately 2030. MD William aware. pt tolerated PO well. pt appears well, at baseline mentals status. Seizure precautions and cardiac monitor in place. Mother at bedside updated on plan of care. safety and comfort maintained.

## 2025-01-23 NOTE — ED ADULT NURSE REASSESSMENT NOTE - NS ED NURSE REASSESS COMMENT FT1
Pt began to have seizure in stretcher in front of RN station. CODE 99 called. Pt taken directly to trauma room. Pt placed on side. Suction set up. Pt placed on cardiac monitor. Verbal order for 2mg IVP ativan ordered by MD William at bedside. Seizure lasted approx. 2 minutes. Pt mother stating this seizure is normal for pt. Pt to remain in trauma room for closer monitoring. As per MD macias over IV version of pt home seizure medication.

## 2025-01-23 NOTE — ED PROVIDER NOTE - OBJECTIVE STATEMENT
29-year-old male  brought in by mom from home with past medical history of seizures and nonverbal, with complaints of a few days of his teeth hurting to the point where he will not eat anything or take his usual seizure medications.  Due to that he has had an increased frequency of seizures.  Mom states he has not had his antiepileptic medicines since yesterday.  No fevers.  Mom states she is in the process of getting him an appointment with dentist.  No swelling in his face.  No fever.  Mom also notes last week patient had a viral syndrome consisting of runny nose and cough.

## 2025-01-23 NOTE — ED STATDOCS - PROGRESS NOTE DETAILS
30 y/o male w/ PMHx intellectual disability, epilepsy, and seizures, nonverbal at baseline presents c/o poor PO intake, w brother attesting for pt. Brother also reports that pt's gums look red, dentist appointment scheduled for Apr 4. Last seizure this morning. Has not taken seizure meds in ~2d. Pt will be sent to Main ED for further work up and evaluation. Lisandro Gay for attending Dr. Sun   28 y/o male w/ PMHx intellectual disability, epilepsy, and seizures, nonverbal at baseline presents c/o poor PO intake, with brother attesting for pt. Brother also reports that pt's gums look red, dentist appointment scheduled for Apr 4. Last seizure this morning. Has not taken seizure meds in ~2d. Family requesting IV fluids, labwork. Pt will be sent to Main ED for further work up and evaluation due to recent history of frequent seizures.

## 2025-01-23 NOTE — ED PROVIDER NOTE - PROGRESS NOTE DETAILS
Patient had  a seizure in the emergency department.  Mom at bedside states this is a usual seizure for him.  Given 2 of IV Ativan with seizure stopping.  Discussed with neurology Dr. Lott who advised  can give lacosamide 150 mg IV.  Patient now awake and alert at his normal baseline and tolerated his usual seizure medicine by mouth.  Mom wants to take patient home to see dentist in the morning.

## 2025-01-23 NOTE — ED ADULT NURSE REASSESSMENT NOTE - NS ED NURSE REASSESS COMMENT FT1
Report received from ISMAEL Andres. Pt is nonverbal at baseline. pt post-ictal but arousable to voice and touch. Attempted to look at pupils but pt continuously turning away. Per mom at bedside, pt has seizures daily and this is normal s/p seizure. Pt does not appear in acute distress. Mother updated on plan of care. Seizure precautions in place. VS documented. Safety and comfort maintained.

## 2025-01-24 DIAGNOSIS — Z88.8 ALLERGY STATUS TO OTHER DRUGS, MEDICAMENTS AND BIOLOGICAL SUBSTANCES: ICD-10-CM

## 2025-01-24 DIAGNOSIS — K08.89 OTHER SPECIFIED DISORDERS OF TEETH AND SUPPORTING STRUCTURES: ICD-10-CM

## 2025-01-24 DIAGNOSIS — R56.9 UNSPECIFIED CONVULSIONS: ICD-10-CM

## 2025-01-28 ENCOUNTER — OUTPATIENT (OUTPATIENT)
Dept: OUTPATIENT SERVICES | Facility: HOSPITAL | Age: 30
LOS: 1 days | End: 2025-01-28
Payer: MEDICAID

## 2025-01-28 ENCOUNTER — APPOINTMENT (OUTPATIENT)
Age: 30
End: 2025-01-28
Payer: MEDICAID

## 2025-01-28 PROCEDURE — D0140: CPT

## 2025-01-28 PROCEDURE — D0270: CPT

## 2025-01-28 PROCEDURE — ZZZZZ: CPT

## 2025-04-07 ENCOUNTER — EMERGENCY (EMERGENCY)
Facility: HOSPITAL | Age: 30
LOS: 0 days | Discharge: HOME | End: 2025-04-07
Attending: EMERGENCY MEDICINE
Payer: MEDICAID

## 2025-04-07 VITALS
DIASTOLIC BLOOD PRESSURE: 77 MMHG | RESPIRATION RATE: 17 BRPM | TEMPERATURE: 98 F | OXYGEN SATURATION: 99 % | SYSTOLIC BLOOD PRESSURE: 128 MMHG | HEART RATE: 66 BPM

## 2025-04-07 VITALS
TEMPERATURE: 98 F | OXYGEN SATURATION: 96 % | DIASTOLIC BLOOD PRESSURE: 76 MMHG | HEART RATE: 68 BPM | RESPIRATION RATE: 18 BRPM | SYSTOLIC BLOOD PRESSURE: 135 MMHG

## 2025-04-07 DIAGNOSIS — R56.9 UNSPECIFIED CONVULSIONS: ICD-10-CM

## 2025-04-07 DIAGNOSIS — Z88.8 ALLERGY STATUS TO OTHER DRUGS, MEDICAMENTS AND BIOLOGICAL SUBSTANCES: ICD-10-CM

## 2025-04-07 DIAGNOSIS — G40.909 EPILEPSY, UNSPECIFIED, NOT INTRACTABLE, WITHOUT STATUS EPILEPTICUS: ICD-10-CM

## 2025-04-07 LAB
ALBUMIN SERPL ELPH-MCNC: 4.1 G/DL — SIGNIFICANT CHANGE UP (ref 3.3–5)
ALP SERPL-CCNC: 102 U/L — SIGNIFICANT CHANGE UP (ref 40–120)
ALT FLD-CCNC: 34 U/L — SIGNIFICANT CHANGE UP (ref 12–78)
ANION GAP SERPL CALC-SCNC: 3 MMOL/L — LOW (ref 5–17)
APPEARANCE UR: CLEAR — SIGNIFICANT CHANGE UP
AST SERPL-CCNC: 17 U/L — SIGNIFICANT CHANGE UP (ref 15–37)
BASOPHILS # BLD AUTO: 0.06 K/UL — SIGNIFICANT CHANGE UP (ref 0–0.2)
BASOPHILS NFR BLD AUTO: 1 % — SIGNIFICANT CHANGE UP (ref 0–2)
BILIRUB SERPL-MCNC: 0.3 MG/DL — SIGNIFICANT CHANGE UP (ref 0.2–1.2)
BILIRUB UR-MCNC: NEGATIVE — SIGNIFICANT CHANGE UP
BUN SERPL-MCNC: 13 MG/DL — SIGNIFICANT CHANGE UP (ref 7–23)
CALCIUM SERPL-MCNC: 9.5 MG/DL — SIGNIFICANT CHANGE UP (ref 8.5–10.1)
CHLORIDE SERPL-SCNC: 111 MMOL/L — HIGH (ref 96–108)
CO2 SERPL-SCNC: 26 MMOL/L — SIGNIFICANT CHANGE UP (ref 22–31)
COLOR SPEC: YELLOW — SIGNIFICANT CHANGE UP
CREAT SERPL-MCNC: 1.05 MG/DL — SIGNIFICANT CHANGE UP (ref 0.5–1.3)
DIFF PNL FLD: NEGATIVE — SIGNIFICANT CHANGE UP
EGFR: 100 ML/MIN/1.73M2 — SIGNIFICANT CHANGE UP
EGFR: 100 ML/MIN/1.73M2 — SIGNIFICANT CHANGE UP
EOSINOPHIL # BLD AUTO: 0.12 K/UL — SIGNIFICANT CHANGE UP (ref 0–0.5)
EOSINOPHIL NFR BLD AUTO: 2 % — SIGNIFICANT CHANGE UP (ref 0–6)
GLUCOSE SERPL-MCNC: 86 MG/DL — SIGNIFICANT CHANGE UP (ref 70–99)
GLUCOSE UR QL: NEGATIVE MG/DL — SIGNIFICANT CHANGE UP
HCT VFR BLD CALC: 43.1 % — SIGNIFICANT CHANGE UP (ref 39–50)
HGB BLD-MCNC: 13.2 G/DL — SIGNIFICANT CHANGE UP (ref 13–17)
IMM GRANULOCYTES # BLD AUTO: 0.01 K/UL — SIGNIFICANT CHANGE UP (ref 0–0.07)
IMM GRANULOCYTES NFR BLD AUTO: 0.2 % — SIGNIFICANT CHANGE UP (ref 0–0.9)
KETONES UR-MCNC: NEGATIVE MG/DL — SIGNIFICANT CHANGE UP
LEUKOCYTE ESTERASE UR-ACNC: NEGATIVE — SIGNIFICANT CHANGE UP
LYMPHOCYTES # BLD AUTO: 1.23 K/UL — SIGNIFICANT CHANGE UP (ref 1–3.3)
LYMPHOCYTES NFR BLD AUTO: 20.9 % — SIGNIFICANT CHANGE UP (ref 13–44)
MAGNESIUM SERPL-MCNC: 2.4 MG/DL — SIGNIFICANT CHANGE UP (ref 1.6–2.6)
MCHC RBC-ENTMCNC: 23 PG — LOW (ref 27–34)
MCHC RBC-ENTMCNC: 30.6 G/DL — LOW (ref 32–36)
MCV RBC AUTO: 75.2 FL — LOW (ref 80–100)
MONOCYTES # BLD AUTO: 0.38 K/UL — SIGNIFICANT CHANGE UP (ref 0–0.9)
MONOCYTES NFR BLD AUTO: 6.5 % — SIGNIFICANT CHANGE UP (ref 2–14)
NEUTROPHILS # BLD AUTO: 4.09 K/UL — SIGNIFICANT CHANGE UP (ref 1.8–7.4)
NEUTROPHILS NFR BLD AUTO: 69.4 % — SIGNIFICANT CHANGE UP (ref 43–77)
NITRITE UR-MCNC: NEGATIVE — SIGNIFICANT CHANGE UP
NRBC # BLD AUTO: 0 K/UL — SIGNIFICANT CHANGE UP (ref 0–0)
NRBC # FLD: 0 K/UL — SIGNIFICANT CHANGE UP (ref 0–0)
NRBC BLD AUTO-RTO: 0 /100 WBCS — SIGNIFICANT CHANGE UP (ref 0–0)
PH UR: 6.5 — SIGNIFICANT CHANGE UP (ref 5–8)
PLATELET # BLD AUTO: 206 K/UL — SIGNIFICANT CHANGE UP (ref 150–400)
PMV BLD: 12 FL — SIGNIFICANT CHANGE UP (ref 7–13)
POTASSIUM SERPL-MCNC: 3.9 MMOL/L — SIGNIFICANT CHANGE UP (ref 3.5–5.3)
POTASSIUM SERPL-SCNC: 3.9 MMOL/L — SIGNIFICANT CHANGE UP (ref 3.5–5.3)
PROT SERPL-MCNC: 8 GM/DL — SIGNIFICANT CHANGE UP (ref 6–8.3)
PROT UR-MCNC: NEGATIVE MG/DL — SIGNIFICANT CHANGE UP
RBC # BLD: 5.73 M/UL — SIGNIFICANT CHANGE UP (ref 4.2–5.8)
RBC # FLD: 15 % — HIGH (ref 10.3–14.5)
SODIUM SERPL-SCNC: 140 MMOL/L — SIGNIFICANT CHANGE UP (ref 135–145)
SP GR SPEC: 1.02 — SIGNIFICANT CHANGE UP (ref 1–1.03)
UROBILINOGEN FLD QL: 0.2 MG/DL — SIGNIFICANT CHANGE UP (ref 0.2–1)
WBC # BLD: 5.89 K/UL — SIGNIFICANT CHANGE UP (ref 3.8–10.5)
WBC # FLD AUTO: 5.89 K/UL — SIGNIFICANT CHANGE UP (ref 3.8–10.5)

## 2025-04-07 PROCEDURE — 83735 ASSAY OF MAGNESIUM: CPT

## 2025-04-07 PROCEDURE — 85025 COMPLETE CBC W/AUTO DIFF WBC: CPT

## 2025-04-07 PROCEDURE — 99285 EMERGENCY DEPT VISIT HI MDM: CPT

## 2025-04-07 PROCEDURE — 99285 EMERGENCY DEPT VISIT HI MDM: CPT | Mod: 25

## 2025-04-07 PROCEDURE — 93005 ELECTROCARDIOGRAM TRACING: CPT

## 2025-04-07 PROCEDURE — 81003 URINALYSIS AUTO W/O SCOPE: CPT

## 2025-04-07 PROCEDURE — 36415 COLL VENOUS BLD VENIPUNCTURE: CPT

## 2025-04-07 PROCEDURE — 71045 X-RAY EXAM CHEST 1 VIEW: CPT | Mod: 26

## 2025-04-07 PROCEDURE — 93010 ELECTROCARDIOGRAM REPORT: CPT

## 2025-04-07 PROCEDURE — 71045 X-RAY EXAM CHEST 1 VIEW: CPT

## 2025-04-07 PROCEDURE — 80053 COMPREHEN METABOLIC PANEL: CPT

## 2025-04-07 RX ADMIN — Medication 1000 MILLILITER(S): at 13:00

## 2025-04-07 NOTE — ED ADULT NURSE NOTE - NSFALLRISKINTERV_ED_ALL_ED
Assistance with ambulation/Communicate fall risk and risk factors to all staff, patient, and family/Provide visual cue: yellow wristband, yellow gown, etc/Reinforce activity limits and safety measures with patient and family/Use of alarms - bed, stretcher, chair and/or video monitoring/Call bell, personal items and telephone in reach/Instruct patient to call for assistance before getting out of bed/chair/stretcher/Non-slip footwear applied when patient is off stretcher/[place] to call system/Physically safe environment - no spills, clutter or unnecessary equipment/Purposeful Proactive Rounding/Room/bathroom lighting operational, light cord in reach

## 2025-04-07 NOTE — ED PROVIDER NOTE - CLINICAL SUMMARY MEDICAL DECISION MAKING FREE TEXT BOX
29M hx epilepsy on epidolex 400mg BID Lacosamide 100mg BID and Zunisamide  200mg BID presents to the emergency department for seizure.  Patient is here with mother who states that patient had episode of seizure this morning lasted for about 3 minutes and self resolved was located on the right side of his body which is typical for seizures.  Had a seizure yesterday as well.  States that he gets seizures fairly frequently about 1 a month.  Started following with a new neurologist Dr. Gilliam  Who recently added zonisamide to his regimen.  Over the past few days patient's been acting normally no fevers nausea vomiting diarrhea falls head trauma.  Exam here is nonfocal  patient is well-appearing and nontoxic.  Patient with breakthrough seizure will check labs for electrolytes touch base with patient's neurologist and reassess. 29M hx epilepsy on epidolex 400mg BID Lacosamide 100mg BID and Zunisamide  200mg BID presents to the emergency department for seizure.  Patient is here with mother who states that patient had episode of seizure this morning lasted for about 3 minutes and self resolved was located on the right side of his body which is typical for seizures.  Had a seizure yesterday as well.  States that he gets seizures fairly frequently about 1 a month.  Started following with a new neurologist Dr. Gilliam  Who recently added zonisamide to his regimen.  Over the past few days patient's been acting normally no fevers nausea vomiting diarrhea falls head trauma.  Exam here is nonfocal  patient is well-appearing and nontoxic.  Patient with breakthrough seizure will check labs for electrolytes touch base with patient's neurologist (Dr. Gilliam) and reassess.  1228 paged pts neurologist. spoke with neurology at  on call Dr. Ruelas - discussed pts current meds. no changes at this time, should follow up with primary neurologist. agrees with plan of checking labs - if unremarkable and pt at baseline can dc with neuro follow up. Vince Miller M.D., Attending Physician 29M hx epilepsy on epidolex 400mg BID Lacosamide 100mg BID and Zunisamide  200mg BID presents to the emergency department for seizure.  Patient is here with mother who states that patient had episode of seizure this morning lasted for about 3 minutes and self resolved was located on the right side of his body which is typical for seizures.  Had a seizure yesterday as well.  States that he gets seizures fairly frequently about 1 a month.  Started following with a new neurologist Dr. Gilliam  Who recently added zonisamide to his regimen.  Over the past few days patient's been acting normally no fevers nausea vomiting diarrhea falls head trauma.  Exam here is nonfocal  patient is well-appearing and nontoxic.  Patient with breakthrough seizure will check labs for electrolytes touch base with patient's neurologist (Dr. Gilliam) and reassess.  1228 paged pts neurologist. spoke with neurology at  on call Dr. Ruelas - discussed pts current meds. no changes at this time, should follow up with primary neurologist. agrees with plan of checking labs - if unremarkable and pt at baseline can dc with neuro follow up. Vince Miller M.D., Attending Physician  146 all labs and imaging reviewed by myself. labs unremarkable. xray independently interpreted by myself and is negative. pt and family comfortable going home. will dc with follow up and strict return precautions. Vince Miller M.D., Attending Physician

## 2025-04-07 NOTE — ED ADULT NURSE NOTE - OBJECTIVE STATEMENT
pt bib family s/p seizure. pts mother states pt had episode of seizure this morning lasted approx 3 min and self resolved was located on the right side of his body which is typical for seizures. mother states  pt had a seizure yesterday as well.  mother states pt gets seizures approx 1x a month. pmh epilepsy

## 2025-04-07 NOTE — ED ADULT NURSE NOTE - NSFALLRISKASMT_ED_ALL_ED_DT
PATIENT INFORMATION    Anticipatory guidance discussed  Bicycle helmets  Drugs, ETOH, and tobacco  Importance of regular dental care  Importance of regular exercise  Importance of varied diet  Limit TV, media violence  Minimize junk food  Puberty  Safe storage of any firearms in the home  Seat belts  Sex; STD and pregnancy prevention    Follow-Up  - Return for your yearly well child visit.    14 years old Health and Safety Tips - The following hyperlinks are available to access via [employer]    Parent Education from [employer]    Educación para padres sobre niños sanos    Additional Educational Resources:  For additional resources regarding your symptoms, diagnosis, or further health information, please visit the Discover a Healthier You section on [email or web address] or the Online Health Resources section in [employer].   07-Apr-2025 14:19

## 2025-04-07 NOTE — ED PROVIDER NOTE - PHYSICAL EXAMINATION
Constitutional: NAD AAOx3  Eyes: PERRLA EOMI  Head: Normocephalic atraumatic  Mouth: MMM  Cardiac: regular rate   Resp: unlabored breathing clear b/l   GI: Abd s/nt/nd  Neuro: cn2-12 intact normal strength sensation  Skin: No visible rashes

## 2025-04-07 NOTE — ED PROVIDER NOTE - NSFOLLOWUPINSTRUCTIONS_ED_ALL_ED_FT
1. return for worsening symptoms or anything concerning to you  2. take all home meds as prescribed  3. follow up with your pmd call to make an appointment  4. If you cannot secure follow up with your PMD or specialist within 24 hours and you have non-emergent questions or concerns please call the [facility] ([phone]) in order to speak with an emergency physician open 24 hours/day, 7 days/week.  5. follow up with your neurologist    Seizure, Adult  When you have a seizure:    Parts of your body may move.  How aware or awake (conscious) you are may change.  You may shake (convulse).    Some people have symptoms right before a seizure happens. These symptoms may include:    Fear.  Worry (anxiety).  Feeling like you are going to throw up (nausea).  Feeling like the room is spinning (vertigo).  Feeling like you saw or heard something before (isela vu).  Odd tastes or smells.  Changes in vision, such as seeing flashing lights or spots.    ImageSeizures usually last from 30 seconds to 2 minutes. Usually, they are not harmful unless they last a long time.    Follow these instructions at home:  Medicines     Take over-the-counter and prescription medicines only as told by your doctor.  Avoid anything that may keep your medicine from working, such as alcohol.  Activity     Do not do any activities that would be dangerous if you had another seizure, like driving or swimming. Wait until your doctor approves.  If you live in the [place] ask your local DMV (department of [employer]) when you can drive.  Rest.  Teaching others     Teach friends and family what to do when you have a seizure. They should:    Lay you on the ground.  Protect your head and body.  Loosen any tight clothing around your neck.  Turn you on your side.  Stay with you until you are better.  Not hold you down.  Not put anything in your mouth.  Know whether or not you need emergency care.    General instructions     Contact your doctor each time you have a seizure.  Avoid anything that gives you seizures.  Keep a seizure diary. Write down:    What you think caused each seizure.  What you remember about each seizure.    Keep all follow-up visits as told by your doctor. This is important.  Contact a doctor if:  You have another seizure.  You have seizures more often.  There is any change in what happens during your seizures.  You continue to have seizures with treatment.  You have symptoms of being sick or having an infection.  Get help right away if:  You have a seizure:    That lasts longer than 5 minutes.  That is different than seizures you had before.  That makes it harder to breathe.  After you hurt your head.    After a seizure, you cannot speak or use a part of your body.  After a seizure, you are confused or have a bad headache.  You have two or more seizures in a row.  You are having seizures more often.  You do not wake up right after a seizure.  You get hurt during a seizure.  In an emergency:     These symptoms may be an emergency. Do not wait to see if the symptoms will go away. Get medical help right away. Call your local emergency services (911 in the [place] Do not drive yourself to the hospital.   This information is not intended to replace advice given to you by your health care provider. Make sure you discuss any questions you have with your health care provider.

## 2025-04-07 NOTE — ED PROVIDER NOTE - PATIENT PORTAL LINK FT
You can access the FollowMyHealth Patient Portal offered by [facility] by registering at the following website: [email or web address]. By joining [employer]’s FollowMyHealth portal, you will also be able to view your health information using other applications (apps) compatible with our system.

## 2025-04-07 NOTE — ED PROVIDER NOTE - OBJECTIVE STATEMENT
29M hx epilepsy on epidolex 400mg BID Lacosamide 100mg BID and Zunisamide  200mg BID presents to the emergency department for seizure.  Patient is here with mother who states that patient had episode of seizure this morning lasted for about 3 minutes and self resolved was located on the right side of his body which is typical for seizures.  Had a seizure yesterday as well.  States that he gets seizures fairly frequently about 1 a month.  Started following with a new neurologist Dr. Gilliam  Who recently added zonisamide to his regimen.  Over the past few days patient's been acting normally no fevers nausea vomiting diarrhea falls head trauma.  Exam here is nonfocal  patient is well-appearing and nontoxic.  Patient with breakthrough seizure will check labs for electrolytes touch base with patient's neurologist and reassess.

## 2025-04-07 NOTE — ED ADULT TRIAGE NOTE - CHIEF COMPLAINT QUOTE
Pt brought in by ambulance from home s/p seizure. As per EMS, pt has a hx of seizures and has them often. EMS states that mom reports that pt had a seizure last night and then again this am. Pt is A&Ox1.  Mom is at bedside.

## 2025-04-22 DIAGNOSIS — Z01.20 ENCOUNTER FOR DENTAL EXAMINATION AND CLEANING WITHOUT ABNORMAL FINDINGS: ICD-10-CM
